# Patient Record
Sex: FEMALE | Race: OTHER | Employment: OTHER | URBAN - METROPOLITAN AREA
[De-identification: names, ages, dates, MRNs, and addresses within clinical notes are randomized per-mention and may not be internally consistent; named-entity substitution may affect disease eponyms.]

---

## 2017-01-09 ENCOUNTER — ROUTINE PRENATAL (OUTPATIENT)
Dept: OBGYN CLINIC | Facility: CLINIC | Age: 37
End: 2017-01-09

## 2017-01-09 VITALS
WEIGHT: 131 LBS | DIASTOLIC BLOOD PRESSURE: 60 MMHG | SYSTOLIC BLOOD PRESSURE: 95 MMHG | HEART RATE: 89 BPM | BODY MASS INDEX: 26 KG/M2

## 2017-01-09 DIAGNOSIS — Z34.92 ENCOUNTER FOR SUPERVISION OF NORMAL PREGNANCY IN SECOND TRIMESTER, UNSPECIFIED GRAVIDITY: Primary | ICD-10-CM

## 2017-01-09 PROBLEM — Z86.39 HISTORY OF HYPOTHYROIDISM: Status: ACTIVE | Noted: 2017-01-09

## 2017-01-09 LAB
MULTISTIX LOT#: NORMAL NUMERIC
PH, URINE: 6 (ref 4.5–8)
SPECIFIC GRAVITY: 1.01 (ref 1–1.03)
UROBILINOGEN,SEMI-QN: 0.2 MG/DL (ref 0–1.9)

## 2017-01-21 ENCOUNTER — TELEPHONE (OUTPATIENT)
Dept: FAMILY MEDICINE CLINIC | Facility: CLINIC | Age: 37
End: 2017-01-21

## 2017-01-21 NOTE — TELEPHONE ENCOUNTER
Please advise. Thank you. To Dr. Erika Benavides on call for Dr. Evelina Lane. Pt states that she has had a cold and now she started having a cough since Monday and that is intermittently productive with sl blood streak phlegm and nasal discharge.     Pt denies fever bu

## 2017-01-21 NOTE — TELEPHONE ENCOUNTER
LMTCB    Please transfer x [phone] anytime. Thank you. Left general message to continue Tx as she has been doing and that was also discussed and to make an OV appt next week if Sx do not improve or go to the ER if Sx worsen.

## 2017-01-26 ENCOUNTER — TELEPHONE (OUTPATIENT)
Dept: OBGYN CLINIC | Facility: CLINIC | Age: 37
End: 2017-01-26

## 2017-01-26 NOTE — TELEPHONE ENCOUNTER
Emanuel Alarcon report from [address] [place] [address] received via fax and placed in [facility] folder for review. Copy to brown folder.

## 2017-02-04 ENCOUNTER — HOSPITAL ENCOUNTER (OUTPATIENT)
Dept: ULTRASOUND IMAGING | Age: 37
Discharge: HOME | End: 2017-02-04
Attending: OBSTETRICS & GYNECOLOGY
Payer: COMMERCIAL

## 2017-02-04 DIAGNOSIS — Z34.92 ENCOUNTER FOR SUPERVISION OF NORMAL PREGNANCY IN SECOND TRIMESTER, UNSPECIFIED GRAVIDITY: ICD-10-CM

## 2017-02-04 PROCEDURE — 76805 OB US >/= 14 WKS SNGL FETUS: CPT

## 2017-02-08 ENCOUNTER — ROUTINE PRENATAL (OUTPATIENT)
Dept: OBGYN CLINIC | Facility: CLINIC | Age: 37
End: 2017-02-08

## 2017-02-08 VITALS
DIASTOLIC BLOOD PRESSURE: 64 MMHG | BODY MASS INDEX: 26 KG/M2 | SYSTOLIC BLOOD PRESSURE: 98 MMHG | HEART RATE: 93 BPM | WEIGHT: 132 LBS

## 2017-02-08 DIAGNOSIS — Z34.92 ENCOUNTER FOR SUPERVISION OF NORMAL PREGNANCY IN SECOND TRIMESTER, UNSPECIFIED GRAVIDITY: Primary | ICD-10-CM

## 2017-02-08 PROBLEM — O44.20 MARGINAL PLACENTA PREVIA (HCC): Status: ACTIVE | Noted: 2017-02-08

## 2017-02-08 PROBLEM — O44.20 MARGINAL PLACENTA PREVIA: Status: ACTIVE | Noted: 2017-02-08

## 2017-02-08 LAB
MULTISTIX LOT#: NORMAL NUMERIC
PH, URINE: 6 (ref 4.5–8)
SPECIFIC GRAVITY: 1.02 (ref 1–1.03)
UROBILINOGEN,SEMI-QN: 0.2 MG/DL (ref 0–1.9)

## 2017-02-08 RX ORDER — MULTIVITAMIN/MULTIMINERAL SUPPLEMENT 3080; 920; 120; 400; 22; 1.84; 3; 20; 10; 1; 12; 200; 29; 25; 2 [IU]/1; [IU]/1; MG/1; [IU]/1; [IU]/1; MG/1; MG/1; MG/1; MG/1; MG/1; UG/1; MG/1; MG/1; MG/1; MG/1
1 TABLET, FILM COATED ORAL
COMMUNITY
End: 2018-07-23

## 2017-02-08 NOTE — PROGRESS NOTES
Reviewed 20 wk u/s. Negative zika testing. Does not want to f/u with [facility]. RTC 4 wk.

## 2017-03-08 ENCOUNTER — ROUTINE PRENATAL (OUTPATIENT)
Dept: OBGYN CLINIC | Facility: CLINIC | Age: 37
End: 2017-03-08

## 2017-03-08 VITALS
WEIGHT: 139.5 LBS | DIASTOLIC BLOOD PRESSURE: 64 MMHG | SYSTOLIC BLOOD PRESSURE: 101 MMHG | BODY MASS INDEX: 27 KG/M2 | HEART RATE: 96 BPM

## 2017-03-08 DIAGNOSIS — Z20.828 VIRAL DISEASE EXPOSURE: ICD-10-CM

## 2017-03-08 DIAGNOSIS — O44.20 PLACENTA PREVIA, MARGINAL: ICD-10-CM

## 2017-03-08 DIAGNOSIS — O09.523 AMA (ADVANCED MATERNAL AGE) MULTIGRAVIDA 35+, THIRD TRIMESTER: ICD-10-CM

## 2017-03-08 DIAGNOSIS — Z34.82 ENCOUNTER FOR SUPERVISION OF OTHER NORMAL PREGNANCY IN SECOND TRIMESTER: Primary | ICD-10-CM

## 2017-03-08 PROBLEM — O09.529 AMA (ADVANCED MATERNAL AGE) MULTIGRAVIDA 35+: Status: ACTIVE | Noted: 2017-03-08

## 2017-03-08 PROBLEM — O09.529 AMA (ADVANCED MATERNAL AGE) MULTIGRAVIDA 35+ (HCC): Status: ACTIVE | Noted: 2017-03-08

## 2017-03-08 LAB
APPEARANCE: CLEAR
MULTISTIX LOT#: NORMAL NUMERIC
PH, URINE: 7 (ref 4.5–8)
SPECIFIC GRAVITY: 1.01 (ref 1–1.03)
URINE-COLOR: YELLOW
UROBILINOGEN,SEMI-QN: 0.2 MG/DL (ref 0–1.9)

## 2017-03-08 NOTE — PROGRESS NOTES
RTC 3 wks.  Declines to do multiple u/s therefore do f/u u/s at 30 wks to cover placenta, cranial views, growth for AMA, marginal placenta & zika exposure

## 2017-03-29 ENCOUNTER — TELEPHONE (OUTPATIENT)
Dept: OBGYN CLINIC | Facility: CLINIC | Age: 37
End: 2017-03-29

## 2017-04-05 ENCOUNTER — ROUTINE PRENATAL (OUTPATIENT)
Dept: OBGYN CLINIC | Facility: CLINIC | Age: 37
End: 2017-04-05

## 2017-04-05 VITALS
DIASTOLIC BLOOD PRESSURE: 70 MMHG | BODY MASS INDEX: 27 KG/M2 | HEART RATE: 118 BPM | WEIGHT: 140 LBS | SYSTOLIC BLOOD PRESSURE: 105 MMHG

## 2017-04-05 DIAGNOSIS — E03.9 HYPOTHYROIDISM, UNSPECIFIED TYPE: ICD-10-CM

## 2017-04-05 DIAGNOSIS — Z34.83 ENCOUNTER FOR SUPERVISION OF OTHER NORMAL PREGNANCY IN THIRD TRIMESTER: Primary | ICD-10-CM

## 2017-04-05 NOTE — PROGRESS NOTES
Reviewed importance of US for cranial views, growth and placental position. Pt sates that she does not want US because it can be dangerous. When asked she states she \"heard\" there is radiation in [address] and it is dangerous to the baby.  Reviewed with the katherin

## 2017-04-07 ENCOUNTER — TELEPHONE (OUTPATIENT)
Dept: OBGYN CLINIC | Facility: CLINIC | Age: 37
End: 2017-04-07

## 2017-04-10 ENCOUNTER — APPOINTMENT (OUTPATIENT)
Dept: LAB | Age: 37
End: 2017-04-10
Attending: OBSTETRICS & GYNECOLOGY
Payer: COMMERCIAL

## 2017-04-10 DIAGNOSIS — Z34.82 ENCOUNTER FOR SUPERVISION OF OTHER NORMAL PREGNANCY IN SECOND TRIMESTER: ICD-10-CM

## 2017-04-10 DIAGNOSIS — E03.9 HYPOTHYROIDISM, UNSPECIFIED TYPE: ICD-10-CM

## 2017-04-10 PROCEDURE — 85027 COMPLETE CBC AUTOMATED: CPT

## 2017-04-10 PROCEDURE — 84443 ASSAY THYROID STIM HORMONE: CPT

## 2017-04-10 PROCEDURE — 36415 COLL VENOUS BLD VENIPUNCTURE: CPT

## 2017-04-10 PROCEDURE — 82950 GLUCOSE TEST: CPT

## 2017-04-11 ENCOUNTER — TELEPHONE (OUTPATIENT)
Dept: OBGYN CLINIC | Facility: CLINIC | Age: 37
End: 2017-04-11

## 2017-04-11 DIAGNOSIS — R73.09 ELEVATED GLUCOSE TOLERANCE TEST: Primary | ICD-10-CM

## 2017-04-11 NOTE — TELEPHONE ENCOUNTER
Pt's one hour GTT was 222. Advised her to call DM ED and schedule an appt ASAP. # given, informed pt she will be required to send us her BS log every 3-4 days after she learns to check her sugars. Advised pt to send them through [employer].  Pt verbalized unde

## 2017-04-13 ENCOUNTER — HOSPITAL ENCOUNTER (OUTPATIENT)
Dept: ULTRASOUND IMAGING | Age: 37
Discharge: HOME | End: 2017-04-13
Attending: OBSTETRICS & GYNECOLOGY
Payer: COMMERCIAL

## 2017-04-13 DIAGNOSIS — O09.523 AMA (ADVANCED MATERNAL AGE) MULTIGRAVIDA 35+, THIRD TRIMESTER: ICD-10-CM

## 2017-04-13 DIAGNOSIS — Z20.828 VIRAL DISEASE EXPOSURE: ICD-10-CM

## 2017-04-13 DIAGNOSIS — O44.20 PLACENTA PREVIA, MARGINAL: ICD-10-CM

## 2017-04-13 PROBLEM — O24.419 GESTATIONAL DIABETES MELLITUS: Status: ACTIVE | Noted: 2017-04-13

## 2017-04-13 PROBLEM — O24.419 GESTATIONAL DIABETES MELLITUS (HCC): Status: ACTIVE | Noted: 2017-04-13

## 2017-04-13 PROCEDURE — 76816 OB US FOLLOW-UP PER FETUS: CPT

## 2017-04-14 ENCOUNTER — TELEPHONE (OUTPATIENT)
Dept: OBGYN CLINIC | Facility: CLINIC | Age: 37
End: 2017-04-14

## 2017-04-14 ENCOUNTER — HOSPITAL ENCOUNTER (OUTPATIENT)
Dept: ENDOCRINOLOGY | Facility: HOSPITAL | Age: 37
Discharge: HOME | End: 2017-04-14
Attending: OBSTETRICS & GYNECOLOGY
Payer: COMMERCIAL

## 2017-04-14 VITALS — WEIGHT: 144.19 LBS | BODY MASS INDEX: 28 KG/M2

## 2017-04-14 DIAGNOSIS — O99.810 ABNORMAL GLUCOSE TOLERANCE TEST (GTT) DURING PREGNANCY, ANTEPARTUM: Primary | ICD-10-CM

## 2017-04-14 DIAGNOSIS — O24.410 DIET CONTROLLED GESTATIONAL DIABETES MELLITUS (GDM) IN THIRD TRIMESTER: Primary | ICD-10-CM

## 2017-04-14 NOTE — PROGRESS NOTES
Susan Madsen  : 1980 was seen for Gestational Diabetes Counseling: Individual/Group    Date: 2017   Start time: 0800 End time: 1000    Obtained usual diet history.     Education:     GDM Overview:  Reviewed gestational diabetes as diagnosis inc concerns. Patient verbalized understanding and has no further questions at this time.     Alex Chow RN

## 2017-04-14 NOTE — TELEPHONE ENCOUNTER
JEVON CALLING FROM [facility] REQUESTING  NEED  TO US A DIFFERENT MONITOR  / PT WAITING / PLS ADV

## 2017-04-14 NOTE — TELEPHONE ENCOUNTER
----- Message from Irina Colon MD sent at 4/13/2017 12:13 PM CDT -----  Failed 1 hr GTT but since value over 200, automatically diabetic. Does NOT need 3hr GTT. Needs to see diabetic educator.  Fax sugars within one week of that visit

## 2017-04-14 NOTE — TELEPHONE ENCOUNTER
Pharmacist asking if they can give pt an Accucheck plus since insurance does not cover the one ordered. Pharmacist aware any type that is covered is ok to use.

## 2017-04-15 ENCOUNTER — TELEPHONE (OUTPATIENT)
Dept: OBGYN CLINIC | Facility: CLINIC | Age: 37
End: 2017-04-15

## 2017-04-15 NOTE — TELEPHONE ENCOUNTER
Pt is 30w6d and reports she went for DM Ed yesterday and started checking her glucose levels. Pt fasting result this morning was in the 80's and ketone was moderate.  Pt advised to follow the diabetic diet and ensure she is eating a bedtime snack that has c

## 2017-04-19 ENCOUNTER — ROUTINE PRENATAL (OUTPATIENT)
Dept: OBGYN CLINIC | Facility: CLINIC | Age: 37
End: 2017-04-19

## 2017-04-19 VITALS
HEART RATE: 97 BPM | BODY MASS INDEX: 28 KG/M2 | WEIGHT: 142 LBS | SYSTOLIC BLOOD PRESSURE: 103 MMHG | DIASTOLIC BLOOD PRESSURE: 68 MMHG

## 2017-04-19 DIAGNOSIS — Z34.93 ENCOUNTER FOR SUPERVISION OF NORMAL PREGNANCY IN THIRD TRIMESTER, UNSPECIFIED GRAVIDITY: Primary | ICD-10-CM

## 2017-04-19 NOTE — PROGRESS NOTES
Declined Tdap. Reviewed growth u/s. Reviewed BS-- just started 4 days ago. Will fax BS in 2 days. RTC 2 wk.

## 2017-04-24 ENCOUNTER — HOSPITAL ENCOUNTER (OUTPATIENT)
Dept: ENDOCRINOLOGY | Facility: HOSPITAL | Age: 37
Discharge: HOME | End: 2017-04-24
Attending: OBSTETRICS & GYNECOLOGY
Payer: COMMERCIAL

## 2017-04-24 DIAGNOSIS — O24.410 DIET CONTROLLED GESTATIONAL DIABETES MELLITUS (GDM) IN THIRD TRIMESTER: Primary | ICD-10-CM

## 2017-04-24 NOTE — PROGRESS NOTES
Delvis Saucedos  : 1980 was seen for GDM Follow-Up Counseling    Date: 2017   Start time: 1300  End time: 1355    Weight: 142.3#    Blood Glucose:     FB-89 (94)    PP: B: ; L:  (132 on Easter); D:  (50% of values are above selection and rotation of injections. Instructed on proper disposal of sharps. Reviewed proper storage of medication. Reviewed hypoglycemia and the Rule of 15.     Reducing Risk:  Discussed management of (hyperglycemia, hypoglycemia) and when to call pro

## 2017-04-25 ENCOUNTER — TELEPHONE (OUTPATIENT)
Dept: OBGYN CLINIC | Facility: CLINIC | Age: 37
End: 2017-04-25

## 2017-04-25 ENCOUNTER — HOSPITAL ENCOUNTER (OUTPATIENT)
Facility: HOSPITAL | Age: 37
Setting detail: OBSERVATION
Discharge: HOME | End: 2017-04-25
Attending: OBSTETRICS & GYNECOLOGY | Admitting: OBSTETRICS & GYNECOLOGY
Payer: COMMERCIAL

## 2017-04-25 ENCOUNTER — ROUTINE PRENATAL (OUTPATIENT)
Dept: OBGYN CLINIC | Facility: CLINIC | Age: 37
End: 2017-04-25

## 2017-04-25 VITALS
SYSTOLIC BLOOD PRESSURE: 99 MMHG | WEIGHT: 141 LBS | BODY MASS INDEX: 28 KG/M2 | HEART RATE: 96 BPM | DIASTOLIC BLOOD PRESSURE: 66 MMHG

## 2017-04-25 VITALS — HEART RATE: 103 BPM | SYSTOLIC BLOOD PRESSURE: 101 MMHG | DIASTOLIC BLOOD PRESSURE: 76 MMHG

## 2017-04-25 DIAGNOSIS — Z34.83 ENCOUNTER FOR SUPERVISION OF OTHER NORMAL PREGNANCY IN THIRD TRIMESTER: Primary | ICD-10-CM

## 2017-04-25 PROBLEM — O47.9 IRREGULAR CONTRACTIONS (HCC): Status: ACTIVE | Noted: 2017-04-25

## 2017-04-25 PROBLEM — O47.9 IRREGULAR CONTRACTIONS: Status: ACTIVE | Noted: 2017-04-25

## 2017-04-25 PROCEDURE — 59025 FETAL NON-STRESS TEST: CPT | Performed by: OBSTETRICS & GYNECOLOGY

## 2017-04-25 RX ORDER — SODIUM CHLORIDE, SODIUM LACTATE, POTASSIUM CHLORIDE, CALCIUM CHLORIDE 600; 310; 30; 20 MG/100ML; MG/100ML; MG/100ML; MG/100ML
INJECTION, SOLUTION INTRAVENOUS
Status: COMPLETED
Start: 2017-04-25 | End: 2017-04-25

## 2017-04-25 RX ORDER — SODIUM CHLORIDE, SODIUM LACTATE, POTASSIUM CHLORIDE, CALCIUM CHLORIDE 600; 310; 30; 20 MG/100ML; MG/100ML; MG/100ML; MG/100ML
INJECTION, SOLUTION INTRAVENOUS CONTINUOUS
Status: DISCONTINUED | OUTPATIENT
Start: 2017-04-25 | End: 2017-04-25

## 2017-04-25 NOTE — TRIAGE
[facility] HOSP - [facility]      Triage Note    Rusty Florian Patient Status:  Observation    1980 MRN [id number]   Location [address] C-D Attending Angelito Montano, DO   Hosp Day # 0 PCP MD Gama Krause: [id number] evaluation of cramping and back pain. Initially, irritability on EFM tracing. IV fluids given, UA sent. Irritability resolved wit  Fluids. Pt states decreased back pain and no longer feeling contractions. Dr Cesar Cowan re-checked pt.  SVE unchanged from [facility]

## 2017-04-25 NOTE — TELEPHONE ENCOUNTER
ON CALL: paged at 245 am.  Called back. Patient states she had 3 contractions in 30 minutes. No vb or LOF.  +FM. Encouraged PO hydration and to call back if symptoms persist/get worse.   Pt then called at 7am saying that her contractions stopped for a wh

## 2017-04-25 NOTE — TELEPHONE ENCOUNTER
Received message from [employer] [address] on call asking us to call pt and get her an appt this morning with any MD. Pt is 32w2d, c/o irregular UCs. Pt denies LOF, bleeding, and reports normal FM. Appt given at 0940 with CAP. Pt has no further questions.

## 2017-05-02 ENCOUNTER — ROUTINE PRENATAL (OUTPATIENT)
Dept: OBGYN CLINIC | Facility: CLINIC | Age: 37
End: 2017-05-02

## 2017-05-02 VITALS
SYSTOLIC BLOOD PRESSURE: 100 MMHG | WEIGHT: 140 LBS | HEART RATE: 96 BPM | BODY MASS INDEX: 27 KG/M2 | DIASTOLIC BLOOD PRESSURE: 64 MMHG

## 2017-05-02 DIAGNOSIS — Z34.83 ENCOUNTER FOR SUPERVISION OF OTHER NORMAL PREGNANCY IN THIRD TRIMESTER: Primary | ICD-10-CM

## 2017-05-15 ENCOUNTER — ROUTINE PRENATAL (OUTPATIENT)
Dept: OBGYN CLINIC | Facility: CLINIC | Age: 37
End: 2017-05-15

## 2017-05-15 VITALS
SYSTOLIC BLOOD PRESSURE: 97 MMHG | DIASTOLIC BLOOD PRESSURE: 63 MMHG | BODY MASS INDEX: 27 KG/M2 | WEIGHT: 140 LBS | HEART RATE: 97 BPM

## 2017-05-15 DIAGNOSIS — Z34.93 ENCOUNTER FOR SUPERVISION OF NORMAL PREGNANCY IN THIRD TRIMESTER, UNSPECIFIED GRAVIDITY: Primary | ICD-10-CM

## 2017-05-15 NOTE — PROGRESS NOTES
No issues reported. GBS collected. Phone number to schedule NST given. To start at 36wks  And to be done weekly--pt aware. BS log reviewed and wnl. Needs to send BS log weekly. RTC 2 wk. TSH ordered with cbc/trep screen.

## 2017-05-19 ENCOUNTER — TELEPHONE (OUTPATIENT)
Dept: OBGYN CLINIC | Facility: CLINIC | Age: 37
End: 2017-05-19

## 2017-05-19 ENCOUNTER — APPOINTMENT (OUTPATIENT)
Dept: LAB | Age: 37
End: 2017-05-19
Attending: OBSTETRICS & GYNECOLOGY

## 2017-05-19 DIAGNOSIS — Z34.83 ENCOUNTER FOR SUPERVISION OF OTHER NORMAL PREGNANCY IN THIRD TRIMESTER: ICD-10-CM

## 2017-05-19 DIAGNOSIS — Z34.93 ENCOUNTER FOR SUPERVISION OF NORMAL PREGNANCY IN THIRD TRIMESTER, UNSPECIFIED GRAVIDITY: ICD-10-CM

## 2017-05-19 PROCEDURE — 86780 TREPONEMA PALLIDUM: CPT

## 2017-05-19 PROCEDURE — 84443 ASSAY THYROID STIM HORMONE: CPT

## 2017-05-19 PROCEDURE — 85027 COMPLETE CBC AUTOMATED: CPT

## 2017-05-19 PROCEDURE — 36415 COLL VENOUS BLD VENIPUNCTURE: CPT

## 2017-05-19 NOTE — TELEPHONE ENCOUNTER
Pt notified labs done today are not resulted yet. Pt advised to call us Monday afternoon for results.

## 2017-05-24 ENCOUNTER — HOSPITAL ENCOUNTER (OUTPATIENT)
Facility: HOSPITAL | Age: 37
Discharge: HOME | End: 2017-05-24
Attending: OBSTETRICS & GYNECOLOGY | Admitting: OBSTETRICS & GYNECOLOGY
Payer: COMMERCIAL

## 2017-05-24 ENCOUNTER — APPOINTMENT (OUTPATIENT)
Dept: OBGYN CLINIC | Facility: HOSPITAL | Age: 37
End: 2017-05-24
Payer: COMMERCIAL

## 2017-05-24 VITALS — HEART RATE: 97 BPM | RESPIRATION RATE: 18 BRPM | SYSTOLIC BLOOD PRESSURE: 96 MMHG | DIASTOLIC BLOOD PRESSURE: 62 MMHG

## 2017-05-24 PROCEDURE — 59025 FETAL NON-STRESS TEST: CPT | Performed by: OBSTETRICS & GYNECOLOGY

## 2017-05-24 NOTE — NST
Nonstress Test   Patient: Ananestine Hindu    Gestation: 36w3d    NST: SCHEDULED NST FOR AMA AND A1GDM       Variability: Moderate           Accelerations: Yes           Decelerations: None            Baseline: 135 BPM           Uterine Irritability: Yes

## 2017-05-30 ENCOUNTER — ROUTINE PRENATAL (OUTPATIENT)
Dept: OBGYN CLINIC | Facility: CLINIC | Age: 37
End: 2017-05-30

## 2017-05-30 VITALS
HEART RATE: 91 BPM | WEIGHT: 143 LBS | BODY MASS INDEX: 28 KG/M2 | DIASTOLIC BLOOD PRESSURE: 62 MMHG | SYSTOLIC BLOOD PRESSURE: 100 MMHG

## 2017-05-30 DIAGNOSIS — Z34.93 ENCOUNTER FOR SUPERVISION OF NORMAL PREGNANCY IN THIRD TRIMESTER, UNSPECIFIED GRAVIDITY: Primary | ICD-10-CM

## 2017-05-30 DIAGNOSIS — O99.713 PRURITUS GRAVIDARUM, THIRD TRIMESTER: ICD-10-CM

## 2017-05-30 DIAGNOSIS — L29.9 PRURITUS GRAVIDARUM, THIRD TRIMESTER: ICD-10-CM

## 2017-05-30 NOTE — PROGRESS NOTES
RTC 1 wk. Check fasting bile acids due to hand / sole itching. Pt thinks due to seasonal allergies -- can try claritin / Kathlaury Bro[place] / Chao Caldwell.

## 2017-05-31 ENCOUNTER — APPOINTMENT (OUTPATIENT)
Dept: LAB | Facility: HOSPITAL | Age: 37
End: 2017-05-31
Attending: OBSTETRICS & GYNECOLOGY
Payer: COMMERCIAL

## 2017-05-31 ENCOUNTER — TELEPHONE (OUTPATIENT)
Dept: OBGYN CLINIC | Facility: CLINIC | Age: 37
End: 2017-05-31

## 2017-05-31 ENCOUNTER — APPOINTMENT (OUTPATIENT)
Dept: OBGYN CLINIC | Facility: HOSPITAL | Age: 37
End: 2017-05-31
Payer: COMMERCIAL

## 2017-05-31 ENCOUNTER — HOSPITAL ENCOUNTER (OUTPATIENT)
Facility: HOSPITAL | Age: 37
Discharge: HOME | End: 2017-05-31
Attending: OBSTETRICS & GYNECOLOGY | Admitting: OBSTETRICS & GYNECOLOGY
Payer: COMMERCIAL

## 2017-05-31 ENCOUNTER — HOSPITAL ENCOUNTER (OUTPATIENT)
Facility: HOSPITAL | Age: 37
Setting detail: OBSERVATION
Discharge: HOME HEALTH | End: 2017-05-31
Attending: OBSTETRICS & GYNECOLOGY | Admitting: OBSTETRICS & GYNECOLOGY
Payer: COMMERCIAL

## 2017-05-31 VITALS — SYSTOLIC BLOOD PRESSURE: 104 MMHG | DIASTOLIC BLOOD PRESSURE: 70 MMHG | HEART RATE: 88 BPM

## 2017-05-31 VITALS — DIASTOLIC BLOOD PRESSURE: 61 MMHG | SYSTOLIC BLOOD PRESSURE: 101 MMHG | RESPIRATION RATE: 16 BRPM | HEART RATE: 103 BPM

## 2017-05-31 DIAGNOSIS — L29.9 PRURITUS GRAVIDARUM, THIRD TRIMESTER: ICD-10-CM

## 2017-05-31 DIAGNOSIS — O99.713 PRURITUS GRAVIDARUM, THIRD TRIMESTER: ICD-10-CM

## 2017-05-31 PROCEDURE — 59025 FETAL NON-STRESS TEST: CPT | Performed by: OBSTETRICS & GYNECOLOGY

## 2017-05-31 PROCEDURE — 36415 COLL VENOUS BLD VENIPUNCTURE: CPT

## 2017-05-31 PROCEDURE — 82239 BILE ACIDS TOTAL: CPT

## 2017-05-31 NOTE — TELEPHONE ENCOUNTER
37w3d.  Pt calling with lower back pain and contractions. Last visit with [address] on 5/30/17, pt was 1, 0, -3. Pt states that she went for a NST today and she states that she was informed that she was having contractions and was 1 1/2 cm and sent home.   Pt s

## 2017-05-31 NOTE — NST
Nonstress Test   Patient: Charan Connor    Gestation: 37w3d    NST: scheduled NST for AMA and A1GDM       Variability: Moderate           Accelerations: Yes           Decelerations: None            Baseline: 135 BPM           Uterine Irritability: No

## 2017-05-31 NOTE — TELEPHONE ENCOUNTER
Chikis Murrell wants pt to know when her contractions are 10 minutes apart or if she has LOF, then she needs to go [facility]BC.

## 2017-05-31 NOTE — TELEPHONE ENCOUNTER
37 wks 4 days, having pain & contractions and not sure when she should come in , not sure how far apart they are.

## 2017-05-31 NOTE — TELEPHONE ENCOUNTER
Pt called back and the HARESH transferred her. Informed pt that [address] stated when her contractions are every 10 minutes apart or LOF, she needs to go to [facility]. Informed pt if she starts bleeding or does not feel the baby move to call us. Pt stated understanding.

## 2017-06-01 ENCOUNTER — TELEPHONE (OUTPATIENT)
Dept: OBGYN CLINIC | Facility: CLINIC | Age: 37
End: 2017-06-01

## 2017-06-01 ENCOUNTER — HOSPITAL ENCOUNTER (INPATIENT)
Facility: HOSPITAL | Age: 37
LOS: 3 days | Discharge: HOME | End: 2017-06-05
Attending: OBSTETRICS & GYNECOLOGY | Admitting: OBSTETRICS & GYNECOLOGY
Payer: COMMERCIAL

## 2017-06-01 PROBLEM — O26.613 CHOLESTASIS OF PREGNANCY IN THIRD TRIMESTER: Status: ACTIVE | Noted: 2017-06-01

## 2017-06-01 PROBLEM — O26.649 CHOLESTASIS DURING PREGNANCY (HCC): Status: ACTIVE | Noted: 2017-06-01

## 2017-06-01 PROBLEM — K83.1 CHOLESTASIS OF PREGNANCY IN THIRD TRIMESTER: Status: ACTIVE | Noted: 2017-06-01

## 2017-06-01 PROBLEM — O26.619 CHOLESTASIS DURING PREGNANCY: Status: ACTIVE | Noted: 2017-06-01

## 2017-06-01 PROBLEM — K83.1 CHOLESTASIS DURING PREGNANCY: Status: ACTIVE | Noted: 2017-06-01

## 2017-06-01 PROBLEM — O26.643 CHOLESTASIS OF PREGNANCY IN THIRD TRIMESTER (HCC): Status: ACTIVE | Noted: 2017-06-01

## 2017-06-01 RX ORDER — DEXTROSE, SODIUM CHLORIDE, SODIUM LACTATE, POTASSIUM CHLORIDE, AND CALCIUM CHLORIDE 5; .6; .31; .03; .02 G/100ML; G/100ML; G/100ML; G/100ML; G/100ML
125 INJECTION, SOLUTION INTRAVENOUS CONTINUOUS
Status: DISCONTINUED | OUTPATIENT
Start: 2017-06-01 | End: 2017-06-03 | Stop reason: HOSPADM

## 2017-06-01 RX ORDER — AMMONIA INHALANTS 0.04 G/.3ML
0.3 INHALANT RESPIRATORY (INHALATION) AS NEEDED
Status: DISCONTINUED | OUTPATIENT
Start: 2017-06-01 | End: 2017-06-03 | Stop reason: HOSPADM

## 2017-06-01 RX ORDER — 0.9 % SODIUM CHLORIDE 0.9 %
VIAL (ML) INJECTION
Status: COMPLETED
Start: 2017-06-01 | End: 2017-06-01

## 2017-06-01 RX ORDER — LIDOCAINE HYDROCHLORIDE 10 MG/ML
30 INJECTION, SOLUTION EPIDURAL; INFILTRATION; INTRACAUDAL; PERINEURAL ONCE
Status: DISCONTINUED | OUTPATIENT
Start: 2017-06-01 | End: 2017-06-03 | Stop reason: HOSPADM

## 2017-06-01 RX ORDER — SODIUM CHLORIDE 0.9 % (FLUSH) 0.9 %
10 SYRINGE (ML) INJECTION AS NEEDED
Status: DISCONTINUED | OUTPATIENT
Start: 2017-06-01 | End: 2017-06-03 | Stop reason: HOSPADM

## 2017-06-01 RX ORDER — ACETAMINOPHEN 500 MG
500 TABLET ORAL ONCE AS NEEDED
Status: COMPLETED | OUTPATIENT
Start: 2017-06-01 | End: 2017-06-03

## 2017-06-01 NOTE — TELEPHONE ENCOUNTER
Pt advised of results and recs per NJG. Pt is upset crying and asking if NJG will reconsider inducing her earlier as will prefer not to wait until 6pm.  Pt confirms baby moving normally. Pt has other questions for NJG.  Informed will have NJG call her to d

## 2017-06-01 NOTE — TRIAGE
[facility]      Triage Note    Jitendra Lyman Patient Status:  Observation    1980 MRN [id number]   Location [address] C-D Attending Saud Carmichael MD   Hosp Day # 0 PCP MD Amy Hobbs: [id number]   E Reason for visit: dr Terence Isbell notify of pt status, nst, sve. Pt decides to go home to labor and come back .  Labor and kick count instr given      Nasra Collazo RN  5/31/2017 10:13 PM      NST note     I have reviewed the NST and agree with the above fin

## 2017-06-01 NOTE — TELEPHONE ENCOUNTER
Renetta Hanks called to inform pt's bile acid results are 28. See visit notes from 5/30 - pt had c/o hand/sole itching. Msg routed to [address] on-call review and advise further. Pt's next PN is on 6/6/17.

## 2017-06-01 NOTE — TELEPHONE ENCOUNTER
[facility] on-call informed of below. Per NJG pt to be induced d/t cholestasis of pregnancy and high risk for still birth. Need to clarify with NJG when wants pt induced.

## 2017-06-01 NOTE — TELEPHONE ENCOUNTER
ASHLEY informed of below and states will call pt. Per [facility] they are able to take care at 3pm.  Per NJ pt can come for NST now. NJ will call pt.

## 2017-06-01 NOTE — PROGRESS NOTES
Pt is a 29year old female admitted to 371/371-A. Patient presents with:  Scheduled Induction     Pt is [id number] 37w4d intra-uterine pregnancy. History obtained, consents signed. Oriented to room, staff, and plan of care.

## 2017-06-02 RX ORDER — BUPIVACAINE HYDROCHLORIDE 2.5 MG/ML
INJECTION, SOLUTION EPIDURAL; INFILTRATION; INTRACAUDAL
Status: DISCONTINUED
Start: 2017-06-02 | End: 2017-06-02 | Stop reason: WASHOUT

## 2017-06-02 RX ORDER — ZOLPIDEM TARTRATE 5 MG/1
5 TABLET ORAL NIGHTLY PRN
Status: DISCONTINUED | OUTPATIENT
Start: 2017-06-02 | End: 2017-06-05

## 2017-06-02 RX ORDER — EPHEDRINE SULFATE/0.9% NACL/PF 25 MG/5 ML
SYRINGE (ML) INTRAVENOUS
Status: DISCONTINUED
Start: 2017-06-02 | End: 2017-06-02 | Stop reason: WASHOUT

## 2017-06-02 RX ORDER — SODIUM CHLORIDE, SODIUM LACTATE, POTASSIUM CHLORIDE, CALCIUM CHLORIDE 600; 310; 30; 20 MG/100ML; MG/100ML; MG/100ML; MG/100ML
INJECTION, SOLUTION INTRAVENOUS
Status: DISCONTINUED
Start: 2017-06-02 | End: 2017-06-02 | Stop reason: WASHOUT

## 2017-06-02 NOTE — PROGRESS NOTES
SVE done, POC discussed with patient and SO, questions answered and both verbalize understanding. Pitocin and D5LR stopped, EFM discontinued. Patient ordered general diet. IV site covered, and patient up in shower. Will continue to monitor.

## 2017-06-02 NOTE — PLAN OF CARE
Pt had cervidil overnight and was uncomfortable. She did not sleep at all. She is very tired this am. Advised pt to maintain positions that will help facilitate labor and to rest between contractions. Reviewed POC with pt and pts . Both agreeable.  P

## 2017-06-02 NOTE — H&P
[address] Patient Status:  Observation    1980 MRN [id number]   Location [address] C-D Attending Josselyn Lieberman MD   Hosp Day # 0 PCP Neeta Link MD     Date of Admission:  6 admission:  Prenatal Vit-Fe Fumarate-FA (PRENATAL PLUS/IRON) 27-1 MG Oral Tab Take 1 tablet by mouth.  Disp:  Rfl:  5/31/2017 at Unknown time   Blood Glucose Monitoring Suppl ([employer] CONTOUR NEXT MONITOR) w/Device Does not apply Kit 1 Device by Other route 4

## 2017-06-02 NOTE — PROGRESS NOTES
[facility]    Labor Progress Note    Erika Gaspar Patient Status:  Inpatient    1980 MRN [id number]   Location [facility] Attending Christal Montelongo MD   Hosp Day # 1 PCP Karyn Jiménez MD       Subjective   Danya Aleman Assessment/Plan   IUP at 37w5d with cholestasis in pregnancy, received cervidil last night, on pitocen  Plan for recheck at 6 pm & if still not in labor, change plan to cytotec until labor   Epidural once 4 cm  Plan discussed with patient who verbalize

## 2017-06-03 ENCOUNTER — ANESTHESIA EVENT (OUTPATIENT)
Dept: OBGYN UNIT | Facility: HOSPITAL | Age: 37
End: 2017-06-03
Payer: COMMERCIAL

## 2017-06-03 ENCOUNTER — ANESTHESIA (OUTPATIENT)
Dept: OBGYN UNIT | Facility: HOSPITAL | Age: 37
End: 2017-06-03
Payer: COMMERCIAL

## 2017-06-03 PROCEDURE — 3E0P7GC INTRODUCTION OF OTHER THERAPEUTIC SUBSTANCE INTO FEMALE REPRODUCTIVE, VIA NATURAL OR ARTIFICIAL OPENING: ICD-10-PCS | Performed by: OBSTETRICS & GYNECOLOGY

## 2017-06-03 RX ORDER — CLINDAMYCIN PHOSPHATE 900 MG/50ML
900 INJECTION INTRAVENOUS EVERY 8 HOURS
Status: DISCONTINUED | OUTPATIENT
Start: 2017-06-03 | End: 2017-06-04

## 2017-06-03 RX ORDER — PHENYLEPHRINE HCL IN 0.9% NACL 0.5 MG/5ML
SYRINGE (ML) INTRAVENOUS
Status: DISPENSED
Start: 2017-06-03 | End: 2017-06-03

## 2017-06-03 RX ORDER — MISOPROSTOL 200 UG/1
800 TABLET ORAL ONCE
Status: COMPLETED | OUTPATIENT
Start: 2017-06-03 | End: 2017-06-03

## 2017-06-03 RX ORDER — METHYLERGONOVINE MALEATE 0.2 MG/ML
0.2 INJECTION INTRAVENOUS ONCE
Status: COMPLETED | OUTPATIENT
Start: 2017-06-03 | End: 2017-06-03

## 2017-06-03 RX ORDER — SODIUM CHLORIDE 9 MG/ML
INJECTION, SOLUTION INTRAVENOUS
Status: DISPENSED
Start: 2017-06-03 | End: 2017-06-04

## 2017-06-03 RX ORDER — SODIUM CHLORIDE, SODIUM LACTATE, POTASSIUM CHLORIDE, CALCIUM CHLORIDE 600; 310; 30; 20 MG/100ML; MG/100ML; MG/100ML; MG/100ML
INJECTION, SOLUTION INTRAVENOUS
Status: DISPENSED
Start: 2017-06-03 | End: 2017-06-03

## 2017-06-03 RX ORDER — EPHEDRINE SULFATE/0.9% NACL/PF 25 MG/5 ML
5 SYRINGE (ML) INTRAVENOUS AS NEEDED
Status: DISCONTINUED | OUTPATIENT
Start: 2017-06-03 | End: 2017-06-05

## 2017-06-03 RX ORDER — DIAPER,BRIEF,INFANT-TODD,DISP
1 EACH MISCELLANEOUS EVERY 6 HOURS PRN
Status: DISCONTINUED | OUTPATIENT
Start: 2017-06-03 | End: 2017-06-05

## 2017-06-03 RX ORDER — MISOPROSTOL 200 UG/1
TABLET ORAL
Status: COMPLETED
Start: 2017-06-03 | End: 2017-06-03

## 2017-06-03 RX ORDER — NALBUPHINE HCL 10 MG/ML
2.5 AMPUL (ML) INJECTION
Status: DISCONTINUED | OUTPATIENT
Start: 2017-06-03 | End: 2017-06-05

## 2017-06-03 RX ORDER — DOCUSATE SODIUM 100 MG/1
100 CAPSULE, LIQUID FILLED ORAL 2 TIMES DAILY
Status: DISCONTINUED | OUTPATIENT
Start: 2017-06-03 | End: 2017-06-05

## 2017-06-03 RX ORDER — BISACODYL 10 MG
10 SUPPOSITORY, RECTAL RECTAL ONCE AS NEEDED
Status: DISCONTINUED | OUTPATIENT
Start: 2017-06-03 | End: 2017-06-05

## 2017-06-03 RX ORDER — ONDANSETRON 2 MG/ML
4 INJECTION INTRAMUSCULAR; INTRAVENOUS EVERY 6 HOURS PRN
Status: DISCONTINUED | OUTPATIENT
Start: 2017-06-03 | End: 2017-06-05

## 2017-06-03 RX ORDER — LIDOCAINE HYDROCHLORIDE AND EPINEPHRINE 20; 5 MG/ML; UG/ML
INJECTION, SOLUTION EPIDURAL; INFILTRATION; INTRACAUDAL; PERINEURAL
Status: DISPENSED
Start: 2017-06-03 | End: 2017-06-03

## 2017-06-03 RX ORDER — BUPIVACAINE HYDROCHLORIDE 2.5 MG/ML
INJECTION, SOLUTION EPIDURAL; INFILTRATION; INTRACAUDAL AS NEEDED
Status: DISCONTINUED | OUTPATIENT
Start: 2017-06-03 | End: 2017-06-03 | Stop reason: SURG

## 2017-06-03 RX ORDER — METHYLERGONOVINE MALEATE 0.2 MG/ML
INJECTION INTRAVENOUS
Status: COMPLETED
Start: 2017-06-03 | End: 2017-06-03

## 2017-06-03 RX ORDER — PRENATAL VIT,CAL 76/IRON/FOLIC 29 MG-1 MG
1 TABLET ORAL DAILY
Status: DISCONTINUED | OUTPATIENT
Start: 2017-06-03 | End: 2017-06-05

## 2017-06-03 RX ORDER — EPHEDRINE SULFATE/0.9% NACL/PF 25 MG/5 ML
SYRINGE (ML) INTRAVENOUS
Status: DISPENSED
Start: 2017-06-03 | End: 2017-06-03

## 2017-06-03 RX ORDER — LIDOCAINE HYDROCHLORIDE 10 MG/ML
INJECTION, SOLUTION EPIDURAL; INFILTRATION; INTRACAUDAL; PERINEURAL AS NEEDED
Status: DISCONTINUED | OUTPATIENT
Start: 2017-06-03 | End: 2017-06-03 | Stop reason: SURG

## 2017-06-03 RX ORDER — SODIUM CHLORIDE 0.9 % (FLUSH) 0.9 %
10 SYRINGE (ML) INJECTION AS NEEDED
Status: DISCONTINUED | OUTPATIENT
Start: 2017-06-03 | End: 2017-06-05

## 2017-06-03 RX ORDER — BUPIVACAINE HYDROCHLORIDE 2.5 MG/ML
INJECTION, SOLUTION EPIDURAL; INFILTRATION; INTRACAUDAL
Status: DISPENSED
Start: 2017-06-03 | End: 2017-06-03

## 2017-06-03 RX ORDER — HYDROCODONE BITARTRATE AND ACETAMINOPHEN 5; 325 MG/1; MG/1
1 TABLET ORAL EVERY 6 HOURS PRN
Status: DISCONTINUED | OUTPATIENT
Start: 2017-06-03 | End: 2017-06-05

## 2017-06-03 RX ORDER — LIDOCAINE HYDROCHLORIDE AND EPINEPHRINE 15; 5 MG/ML; UG/ML
INJECTION, SOLUTION EPIDURAL AS NEEDED
Status: DISCONTINUED | OUTPATIENT
Start: 2017-06-03 | End: 2017-06-03 | Stop reason: SURG

## 2017-06-03 RX ORDER — SODIUM CHLORIDE, SODIUM LACTATE, POTASSIUM CHLORIDE, CALCIUM CHLORIDE 600; 310; 30; 20 MG/100ML; MG/100ML; MG/100ML; MG/100ML
INJECTION, SOLUTION INTRAVENOUS
Status: COMPLETED
Start: 2017-06-03 | End: 2017-06-03

## 2017-06-03 RX ORDER — IBUPROFEN 600 MG/1
600 TABLET ORAL EVERY 6 HOURS PRN
Status: DISCONTINUED | OUTPATIENT
Start: 2017-06-03 | End: 2017-06-05

## 2017-06-03 RX ORDER — SIMETHICONE 80 MG
80 TABLET,CHEWABLE ORAL 3 TIMES DAILY PRN
Status: DISCONTINUED | OUTPATIENT
Start: 2017-06-03 | End: 2017-06-05

## 2017-06-03 RX ORDER — AMMONIA INHALANTS 0.04 G/.3ML
0.3 INHALANT RESPIRATORY (INHALATION) AS NEEDED
Status: DISCONTINUED | OUTPATIENT
Start: 2017-06-03 | End: 2017-06-05

## 2017-06-03 RX ADMIN — LIDOCAINE HYDROCHLORIDE AND EPINEPHRINE 3 ML: 15; 5 INJECTION, SOLUTION EPIDURAL at 07:29:00

## 2017-06-03 RX ADMIN — LIDOCAINE HYDROCHLORIDE 3 ML: 10 INJECTION, SOLUTION EPIDURAL; INFILTRATION; INTRACAUDAL; PERINEURAL at 07:25:00

## 2017-06-03 RX ADMIN — BUPIVACAINE HYDROCHLORIDE 0.2 ML: 2.5 INJECTION, SOLUTION EPIDURAL; INFILTRATION; INTRACAUDAL at 07:27:00

## 2017-06-03 NOTE — PROGRESS NOTES
Dr. Halima Whitehead notified of patient's fever of 103F; orders rec'd for gentamycin and clindamycin per protocol.

## 2017-06-03 NOTE — PLAN OF CARE
BIRTH - VAGINAL/ SECTION    • Fetal and maternal status remain reassuring during the birth process Completed          PAIN - ADULT    • Verbalizes/displays adequate comfort level or patient's stated pain goal Progressing        POSTPARTUM    • Long

## 2017-06-03 NOTE — ANESTHESIA PROCEDURE NOTES
Labor Analgesia  Performed by: SANDY HARRISON  Authorized by: SANDY HARRISON    Patient Location:  OB  Start Time:  6/3/2017 7:17 AM  End Time:  6/3/2017 7:36 AM  Site Identification: surface landmarks    Reason for Block: labor epidural    Anesthesiolog

## 2017-06-03 NOTE — PROGRESS NOTES
Patient has temperature of 100.7; shivering for half hour. Dr. Busby Nones notified; will recheck in 1 hour. Also, reported prior hemorrhage episode.

## 2017-06-03 NOTE — ANESTHESIA POSTPROCEDURE EVALUATION
Patient: Delvis Stephens    Procedure Summary     Date Anesthesia Start Anesthesia Stop Room / Location    06/03/17 0717 0939        Procedure Diagnosis Scheduled Providers Responsible Provider    LABOR ANALGESIA No diagnosis on file.   Franchesca Galindo MD

## 2017-06-03 NOTE — PROGRESS NOTES
Noted patient to have large amount of bleeding with a couple of large clots. Fundal massage done; IVF of LR hung for bolus; Methergine 0.2 mg IM and Cytotec 800 mcg administered per postpartum hemorrhage protocol.

## 2017-06-03 NOTE — PROGRESS NOTES
Patient received into room 360  via wheelchair .    Bedside report received from Jaja Avila RN.  Bed in locked and low position.  Side rails up x2.  Vitals signs within normal limits, fundus firm at U/1, lochia small, no clots noted. .  Baby girl present at be

## 2017-06-03 NOTE — LACTATION NOTE
LACTATION NOTE - MOTHER           Problems identified  Problems identified: Knowledge deficit    Maternal history  Maternal history: AMA; Gestational diabetes; Hypothyroid    Breastfeeding goal  Breastfeeding goal: To maintain breast milk feeding per patient

## 2017-06-03 NOTE — L&D DELIVERY NOTE
[facility] HOSP - [facility]    Vaginal Delivery Note    Enharleen Alemannathan Patient Status:  Inpatient    1980 MRN [id number]   Location [facility] Attending Annamaria Dance, MD   Hosp Day # 2 PCP Chris Mcbride MD     Delivery     Infant

## 2017-06-03 NOTE — DISCHARGE SUMMARY
[facility] - [facility]    Discharge Summary    Uma Castro Patient Status:  Inpatient    1980 MRN [id number]   Location [address] C-D Attending Ramón Saavedra MD   Hosp Day # 4       Admit date:  2017    Discharge date:  deferred  Extremities: Homans sign is negative, no sign of DVT    Discharged Condition: stable    Disposition: home    Plan:     Follow-up appointment in 6 weeks with Dr. Pastor Eliazar

## 2017-06-03 NOTE — PROGRESS NOTES
[facility]    Labor Progress Note    Yonis Jolley Patient Status:  Inpatient    1980 MRN [id number]   Location [facility] Attending Favian Breen MD   Hosp Day # 2 PCP Jessy Magallon MD       Subjective   Savita Buchanan mary.     Delmy Bloodgood  6/3/2017

## 2017-06-03 NOTE — PROGRESS NOTES
Patient up to void; denies dizziness; assisted with elin care and voided freely. Patient states feeling so much better. Transferred to postpartum via wheelchair with baby in open crib. Report given to Zeinab Pinedo RN.

## 2017-06-03 NOTE — ANESTHESIA PREPROCEDURE EVALUATION
Anesthesia PreOp Note    HPI:     Steve Loja is a 29year old female who presents for preoperative consultation requested by: * No surgeons listed *    Date of Surgery: 6/3/2017    * No procedures listed *  Indication: * No pre-op diagnosis entered * Disp: 1 Box Rfl: 1 Taking       Current Facility-Administered Medications Ordered in Epic:  lactated ringers infusion         fentaNYL citrate (SUBLIMAZE) 0.05 MG/ML injection         lidocaine-EPINEPHrine 2 %-1:812320 injection         phenylephrine HCl i infusion 125 mL/hr Intravenous Continuous Robert Kyle MD Stopped at 06/02/17 1815    Lidocaine HCl (PF) (XYLOCAINE) 1 % injection SOLN 30 mL 30 mL Intradermal Once Olivia Dumont MD     Ammonia Aromatic (ammonia) nasal solution 0.3 mL 0.3 mL Nasal P 06/03/17  0200 06/03/17  0342   BP: 109/65 82/56 90/53 101/62   Pulse: 82 93 69 62   Temp: 98.4 °F (36.9 °C)   98.1 °F (36.7 °C)   TempSrc: Oral   Oral   Resp:            Anesthesia ROS/Med Hx and Physical Exam     Patient summary reviewed and Nursing note

## 2017-06-04 PROBLEM — O41.1290 CHORIOAMNIONITIS: Status: ACTIVE | Noted: 2017-06-04

## 2017-06-04 PROBLEM — O41.1290 CHORIOAMNIONITIS (HCC): Status: ACTIVE | Noted: 2017-06-04

## 2017-06-04 RX ORDER — CETIRIZINE HYDROCHLORIDE 10 MG/1
10 TABLET ORAL DAILY
Status: DISCONTINUED | OUTPATIENT
Start: 2017-06-04 | End: 2017-06-05

## 2017-06-04 NOTE — PROGRESS NOTES
Spoke with Dr. Cesar Cowan regarding pt's antibiotics. Ok to discontinue antibiotics after her afternoon doses.

## 2017-06-04 NOTE — PROGRESS NOTES
[facility]    OB/Gyne Post  Progress Note      Fareedtaz Donaldheidy Patient Status:  Inpatient    1980 MRN [id number]   Location [facility] 3SE Attending Traci Shelley MD   Hosp Day # 3 PCP MD Neetu Krause Marginal placenta previa     AMA (advanced maternal age) multigravida 35+     Gestational diabetes mellitus     Irregular contractions     Cholestasis of pregnancy in third trimester     Cholestasis during pregnancy  .     ambulate, continue routine postpar

## 2017-06-05 ENCOUNTER — TELEPHONE (OUTPATIENT)
Dept: OBGYN CLINIC | Facility: CLINIC | Age: 37
End: 2017-06-05

## 2017-06-05 ENCOUNTER — APPOINTMENT (OUTPATIENT)
Dept: CT IMAGING | Facility: HOSPITAL | Age: 37
End: 2017-06-05
Attending: EMERGENCY MEDICINE
Payer: COMMERCIAL

## 2017-06-05 ENCOUNTER — HOSPITAL ENCOUNTER (EMERGENCY)
Facility: HOSPITAL | Age: 37
Discharge: HOME | End: 2017-06-05
Attending: EMERGENCY MEDICINE
Payer: COMMERCIAL

## 2017-06-05 ENCOUNTER — APPOINTMENT (OUTPATIENT)
Dept: ULTRASOUND IMAGING | Facility: HOSPITAL | Age: 37
End: 2017-06-05
Attending: EMERGENCY MEDICINE
Payer: COMMERCIAL

## 2017-06-05 VITALS
HEIGHT: 60 IN | TEMPERATURE: 98 F | RESPIRATION RATE: 16 BRPM | BODY MASS INDEX: 25.52 KG/M2 | DIASTOLIC BLOOD PRESSURE: 66 MMHG | SYSTOLIC BLOOD PRESSURE: 102 MMHG | HEART RATE: 57 BPM | WEIGHT: 130 LBS | OXYGEN SATURATION: 97 %

## 2017-06-05 VITALS
BODY MASS INDEX: 25.52 KG/M2 | SYSTOLIC BLOOD PRESSURE: 117 MMHG | HEART RATE: 63 BPM | WEIGHT: 130 LBS | DIASTOLIC BLOOD PRESSURE: 73 MMHG | OXYGEN SATURATION: 98 % | HEIGHT: 60 IN | TEMPERATURE: 98 F | RESPIRATION RATE: 18 BRPM

## 2017-06-05 DIAGNOSIS — M54.31 SCIATICA OF RIGHT SIDE: ICD-10-CM

## 2017-06-05 DIAGNOSIS — R07.89 CHEST PAIN, ATYPICAL: Primary | ICD-10-CM

## 2017-06-05 PROCEDURE — 85025 COMPLETE CBC W/AUTO DIFF WBC: CPT | Performed by: EMERGENCY MEDICINE

## 2017-06-05 PROCEDURE — 93971 EXTREMITY STUDY: CPT | Performed by: EMERGENCY MEDICINE

## 2017-06-05 PROCEDURE — 99285 EMERGENCY DEPT VISIT HI MDM: CPT

## 2017-06-05 PROCEDURE — 80048 BASIC METABOLIC PNL TOTAL CA: CPT | Performed by: EMERGENCY MEDICINE

## 2017-06-05 PROCEDURE — 93010 ELECTROCARDIOGRAM REPORT: CPT | Performed by: EMERGENCY MEDICINE

## 2017-06-05 PROCEDURE — 71260 CT THORAX DX C+: CPT | Performed by: EMERGENCY MEDICINE

## 2017-06-05 PROCEDURE — 84484 ASSAY OF TROPONIN QUANT: CPT | Performed by: EMERGENCY MEDICINE

## 2017-06-05 PROCEDURE — 36415 COLL VENOUS BLD VENIPUNCTURE: CPT

## 2017-06-05 PROCEDURE — 93005 ELECTROCARDIOGRAM TRACING: CPT

## 2017-06-05 RX ORDER — IBUPROFEN 600 MG/1
600 TABLET ORAL EVERY 6 HOURS PRN
Qty: 30 TABLET | Refills: 0 | Status: SHIPPED | OUTPATIENT
Start: 2017-06-05 | End: 2017-09-05

## 2017-06-05 RX ORDER — PSEUDOEPHEDRINE HCL 30 MG
100 TABLET ORAL 2 TIMES DAILY PRN
Qty: 60 CAPSULE | Refills: 0 | Status: SHIPPED | OUTPATIENT
Start: 2017-06-05 | End: 2017-09-05

## 2017-06-05 NOTE — TELEPHONE ENCOUNTER
Pt had  on 6/3 and reports passing an apple sized blood clot just now while on the toilet. Pt reports her lochia is otherwise fairly light, some occasional small mucusy clots but not much bleeding. Pt changes a pad 3 x a day.  Pt states her right leg is

## 2017-06-05 NOTE — TELEPHONE ENCOUNTER
Per the pt she delivered on 6/3/17, and is has been having on and off chest pains all day. The pt states that she also just passed a very large blood clot. Please advise.

## 2017-06-05 NOTE — PROGRESS NOTES
[facility] - [facility]    OB/Gyne Post  Progress Note      Mansi Guardian Patient Status:  Inpatient    1980 MRN [id number]   Location [facility] 3SE Attending Greta Willis MD   Hosp Day # 4 PCP MD Mino Velasquez

## 2017-06-05 NOTE — PLAN OF CARE
ANXIETY    • Will report anxiety at manageable levels Completed        BREAST FEEDING    • Optimize infant feeding at the breast Completed        INADEQUATE LATCH, SUCK OR SWALLOW    • Demonstrate ability to latch and sustain latch, audible swallowing and

## 2017-06-06 NOTE — ED PROVIDER NOTES
Patient Seen in: [facility] AND [facility] Emergency Department    History   Patient presents with:  Chest Pain Angina (cardiovascular)    Stated Complaint: JUST LEFT FBC CP AND DIZZINESS    HPI    presents with 2 separate complaints.   She just gave birth vagina controlled   • Diabetes Paternal Grandmother    • Cancer Paternal Grandmother      Cancer - lung (cause of death)   • Diabetes Paternal Aunt    • Glaucoma Neg    • Macular degeneration Neg          Smoking Status: Former Smoker                   Packs/Day: seen.  Neurology:  Moving all extremities equally with good coordination. No cranial nerve asymmetry noted. Psychiatric:  Normal affect. Oriented. No unusual behavior. Interacting well.     We will do CT scan to rule out PE as well as blood tests ultra MD  [address]  [phone]    In 2 days  For re-check      Medications Prescribed:  Current Discharge Medication List

## 2017-06-06 NOTE — TELEPHONE ENCOUNTER
Pt states she went to the ER yesterday and was informed that all results were normal except for some inflammation in the lung. Pt will f/u with her PCP. Pt states she feels better today. Pt passed one small blood clot.  Pt advised to call us if she continue

## 2017-06-06 NOTE — ED INITIAL ASSESSMENT (HPI)
Pt c/o chest pressure, R leg pain, went to bathroom and had large blood clot pass at home. Discharged from [facility] this afternoon. Baby was delivered June 3rd, vaginally. Feeling dizzy, lightheaded.

## 2017-06-09 ENCOUNTER — NURSE ONLY (OUTPATIENT)
Dept: LACTATION | Facility: HOSPITAL | Age: 37
End: 2017-06-09
Payer: COMMERCIAL

## 2017-06-09 PROCEDURE — 99212 OFFICE O/P EST SF 10 MIN: CPT

## 2017-06-09 NOTE — PROGRESS NOTES
Mom is here today with infant due to difficulty feeding at breast. Infant breast well after delivery, but mom decided to only bottle feed at that time. Mom states that her \"milk came in\" on Wednesday this week.  At that time, Mom decided to start to Samantha Ed

## 2017-09-05 ENCOUNTER — OFFICE VISIT (OUTPATIENT)
Dept: FAMILY MEDICINE CLINIC | Facility: CLINIC | Age: 37
End: 2017-09-05

## 2017-09-05 VITALS
SYSTOLIC BLOOD PRESSURE: 98 MMHG | TEMPERATURE: 97 F | DIASTOLIC BLOOD PRESSURE: 66 MMHG | HEIGHT: 60 IN | WEIGHT: 127 LBS | HEART RATE: 91 BPM | BODY MASS INDEX: 24.94 KG/M2

## 2017-09-05 DIAGNOSIS — J06.9 ACUTE URI: ICD-10-CM

## 2017-09-05 DIAGNOSIS — H92.01 RIGHT EAR PAIN: ICD-10-CM

## 2017-09-05 PROCEDURE — 99213 OFFICE O/P EST LOW 20 MIN: CPT | Performed by: FAMILY MEDICINE

## 2017-09-05 PROCEDURE — 99212 OFFICE O/P EST SF 10 MIN: CPT | Performed by: FAMILY MEDICINE

## 2017-09-05 RX ORDER — AMOXICILLIN 875 MG/1
875 TABLET, COATED ORAL 2 TIMES DAILY
Qty: 20 TABLET | Refills: 0 | Status: SHIPPED | OUTPATIENT
Start: 2017-09-05 | End: 2018-07-23

## 2017-09-05 NOTE — PROGRESS NOTES
HPI:    Patient ID: Ozzie La is a 29year old female. Pt presents with cold symptoms for 3-4 days. Pt has had right sided ear ache and sore throat. No fevers. Pt also felt some dizziness which resolved. Pt also had had itching of scalp.  NO rashes or

## 2017-10-07 ENCOUNTER — TELEPHONE (OUTPATIENT)
Dept: OBGYN CLINIC | Facility: CLINIC | Age: 37
End: 2017-10-07

## 2017-10-07 NOTE — TELEPHONE ENCOUNTER
Pt states she had unprotected IC last night and is asking for rx for morning after pill. Informed pt Plan B can now be purchased OTC at a [employer] or [employer]. Pt was unaware. Pt verbalized understanding. Encouraged pt to call with any questions or concerns.

## 2018-07-23 ENCOUNTER — HOSPITAL ENCOUNTER (OUTPATIENT)
Dept: GENERAL RADIOLOGY | Age: 38
Discharge: HOME | End: 2018-07-23
Attending: FAMILY MEDICINE
Payer: COMMERCIAL

## 2018-07-23 ENCOUNTER — NURSE TRIAGE (OUTPATIENT)
Dept: OTHER | Age: 38
End: 2018-07-23

## 2018-07-23 ENCOUNTER — OFFICE VISIT (OUTPATIENT)
Dept: FAMILY MEDICINE CLINIC | Facility: CLINIC | Age: 38
End: 2018-07-23

## 2018-07-23 VITALS
WEIGHT: 120.63 LBS | HEIGHT: 60 IN | RESPIRATION RATE: 12 BRPM | DIASTOLIC BLOOD PRESSURE: 66 MMHG | BODY MASS INDEX: 23.68 KG/M2 | TEMPERATURE: 98 F | SYSTOLIC BLOOD PRESSURE: 98 MMHG | HEART RATE: 78 BPM

## 2018-07-23 DIAGNOSIS — R10.31 RLQ ABDOMINAL PAIN: ICD-10-CM

## 2018-07-23 DIAGNOSIS — R10.31 RIGHT GROIN PAIN: ICD-10-CM

## 2018-07-23 DIAGNOSIS — R10.2 PELVIC PAIN: ICD-10-CM

## 2018-07-23 DIAGNOSIS — S76.211A STRAIN OF ADDUCTOR MAGNUS MUSCLE OF RIGHT LOWER EXTREMITY, INITIAL ENCOUNTER: ICD-10-CM

## 2018-07-23 DIAGNOSIS — S76.211A STRAIN OF ADDUCTOR MAGNUS MUSCLE OF RIGHT LOWER EXTREMITY, INITIAL ENCOUNTER: Primary | ICD-10-CM

## 2018-07-23 PROCEDURE — 99213 OFFICE O/P EST LOW 20 MIN: CPT | Performed by: FAMILY MEDICINE

## 2018-07-23 PROCEDURE — 73502 X-RAY EXAM HIP UNI 2-3 VIEWS: CPT | Performed by: FAMILY MEDICINE

## 2018-07-23 PROCEDURE — 74018 RADEX ABDOMEN 1 VIEW: CPT | Performed by: FAMILY MEDICINE

## 2018-07-23 RX ORDER — POLYETHYLENE GLYCOL 3350 17 G/17G
17 POWDER, FOR SOLUTION ORAL DAILY
Qty: 1 BOTTLE | Refills: 3 | Status: SHIPPED | OUTPATIENT
Start: 2018-07-23 | End: 2018-08-15

## 2018-07-23 RX ORDER — NABUMETONE 750 MG/1
750 TABLET, FILM COATED ORAL 2 TIMES DAILY
Qty: 60 TABLET | Refills: 0 | Status: SHIPPED | OUTPATIENT
Start: 2018-07-23 | End: 2018-08-15

## 2018-07-23 NOTE — TELEPHONE ENCOUNTER
Action Requested: Summary for Provider     []  Critical Lab, Recommendations Needed  [] Need Additional Advice  []   FYI    []   Need Orders  [] Need Medications Sent to Pharmacy  []  Other     SUMMARY:   Appointment made for 7/23/18    Patient stated for

## 2018-07-23 NOTE — PROGRESS NOTES
Patient ID: Yonis Jolley is a 40year old female.     HPI  Patient presents with:  Pain: right thigh    Action Requested: Summary for Provider     []  Critical Lab, Recommendations Needed  [] Need Additional Advice  []   FYI    []   Need Orders  [] Need Me appointment with them. For 2 weeks out of the month she may not feel it at all but the other 2 weeks she will always have some type of discomfort in the area.     She has pain when she tries to abduct her right leg and points to the abductor maria esther tendo Physical Exam  Blood pressure 98/66, pulse 78, temperature 98.2 °F (36.8 °C), temperature source Oral, resp. rate 12, height 5' (1.524 m), weight 120 lb 9.6 oz (54.7 kg), last menstrual period 06/23/2018, not currently breastfeeding.   Physical Exam   Con encounter  -     PHYSICAL THERAPY - INTERNAL  -     Nabumetone 750 MG Oral Tab; Take 1 tablet (750 mg total) by mouth 2 (two) times daily. Take with meals. (for pain/inflammation). -     Cancel: XR HIP + PELVIS MIN 4 VIEWS RIGHT (CPT=73503);  Future  X-ray Requested Specialty: Physical Therapy          Number of Visits Requested: 9      Follow up if symptoms persist.  Take medicine (if given) as prescribed. Approach to treatment discussed and patient/family member understands and agrees to plan.      No Foll

## 2018-08-15 ENCOUNTER — OFFICE VISIT (OUTPATIENT)
Dept: OBGYN CLINIC | Facility: CLINIC | Age: 38
End: 2018-08-15

## 2018-08-15 VITALS
SYSTOLIC BLOOD PRESSURE: 94 MMHG | HEIGHT: 60.4 IN | DIASTOLIC BLOOD PRESSURE: 61 MMHG | HEART RATE: 86 BPM | BODY MASS INDEX: 23.06 KG/M2 | WEIGHT: 119 LBS

## 2018-08-15 DIAGNOSIS — Z01.419 ENCOUNTER FOR GYNECOLOGICAL EXAMINATION: Primary | ICD-10-CM

## 2018-08-15 PROCEDURE — 99395 PREV VISIT EST AGE 18-39: CPT | Performed by: OBSTETRICS & GYNECOLOGY

## 2018-08-16 LAB — HPV I/H RISK 1 DNA SPEC QL NAA+PROBE: NEGATIVE

## 2018-08-16 NOTE — PROGRESS NOTES
Aure Bull is a 40year old female [id number] Patient's last menstrual period was 2018. here for annual exam.       Last seen in pregnancy . Had  with NJG in 2017. Last pap 2016 normal with neg HPV. But pt wants pap today.     Mense or numbness. Psychiatric: denies depression or anxiety. Endocrine:   denies excessive thirst or urination. Heme/Lymph:  easy bruising or bleeding.     PHYSICAL EXAM:   BP 94/61   Pulse 86   Ht 5' 0.4\" (1.534 m)   Wt 119 lb (54 kg)   LMP 07/31/2018   BMI

## 2018-08-17 LAB — LAST PAP RESULT: NORMAL

## 2018-09-04 ENCOUNTER — APPOINTMENT (OUTPATIENT)
Dept: LAB | Facility: HOSPITAL | Age: 38
End: 2018-09-04
Attending: OBSTETRICS & GYNECOLOGY
Payer: COMMERCIAL

## 2018-09-04 ENCOUNTER — TELEPHONE (OUTPATIENT)
Dept: OBGYN CLINIC | Facility: CLINIC | Age: 38
End: 2018-09-04

## 2018-09-04 DIAGNOSIS — Z32.00 UNCONFIRMED PREGNANCY: Primary | ICD-10-CM

## 2018-09-04 DIAGNOSIS — Z32.00 UNCONFIRMED PREGNANCY: ICD-10-CM

## 2018-09-04 LAB — HCG SERPL QL: NEGATIVE

## 2018-09-04 PROCEDURE — 84703 CHORIONIC GONADOTROPIN ASSAY: CPT

## 2018-09-04 PROCEDURE — 36415 COLL VENOUS BLD VENIPUNCTURE: CPT

## 2018-09-04 NOTE — TELEPHONE ENCOUNTER
Pt informed of [facility] recs below. Pt stated that she has taken UPTs in the past that have been negative and she was actually pregnant. Pt stated she would like to complete blood preg test today and call for results tomorrow. Order placed.

## 2018-09-04 NOTE — TELEPHONE ENCOUNTER
Pt informed that Kellee Mendes is out of the office until tomorrow AM and we are waiting for recs. Pt asking if there is another doctor that can order pregnancy test because she \"wants to know today\". Message to [facility] (on call).

## 2018-09-04 NOTE — TELEPHONE ENCOUNTER
Pt states that her period is late and she had unprotected IC   Last period was 7/31/18. Pt states that her period usually comes every 30 days. Pt states only once in Feb it was late. Pt asking to do a blood test at the lab.   Sent to [facility] Stevens [id number] ok for hcg at the

## 2018-09-05 ENCOUNTER — TELEPHONE (OUTPATIENT)
Dept: OBGYN CLINIC | Facility: CLINIC | Age: 38
End: 2018-09-05

## 2018-09-05 NOTE — TELEPHONE ENCOUNTER
APPT SCHEDULED FOR TOMORROW FOR AVTAR IUD INSERTION. SUGGESTED TAKING IBUPROFEN 600MG 30-60 MINUTES BEFORE THE APPT. PT AWARE WE WILL CONFIRM WITH JLK IF CYTOTEC IS NEEDED TONIGHT.   WILL CALL PT BACK ONLY IF SHE NEEDS RX OTHERWISE SHE IS TO KEEP APPT ERWIN

## 2019-04-17 ENCOUNTER — OFFICE VISIT (OUTPATIENT)
Dept: FAMILY MEDICINE CLINIC | Facility: CLINIC | Age: 39
End: 2019-04-17

## 2019-04-17 VITALS
HEART RATE: 91 BPM | OXYGEN SATURATION: 97 % | RESPIRATION RATE: 20 BRPM | BODY MASS INDEX: 24.15 KG/M2 | SYSTOLIC BLOOD PRESSURE: 98 MMHG | WEIGHT: 123 LBS | DIASTOLIC BLOOD PRESSURE: 62 MMHG | TEMPERATURE: 98 F | HEIGHT: 60 IN

## 2019-04-17 DIAGNOSIS — J02.9 SORE THROAT: ICD-10-CM

## 2019-04-17 DIAGNOSIS — J06.9 ACUTE URI: ICD-10-CM

## 2019-04-17 PROCEDURE — 87880 STREP A ASSAY W/OPTIC: CPT | Performed by: FAMILY MEDICINE

## 2019-04-17 PROCEDURE — 99213 OFFICE O/P EST LOW 20 MIN: CPT | Performed by: FAMILY MEDICINE

## 2019-04-17 PROCEDURE — 99212 OFFICE O/P EST SF 10 MIN: CPT | Performed by: FAMILY MEDICINE

## 2019-04-17 RX ORDER — AMOXICILLIN 875 MG/1
875 TABLET, COATED ORAL 2 TIMES DAILY
Qty: 20 TABLET | Refills: 0 | Status: SHIPPED | OUTPATIENT
Start: 2019-04-17 | End: 2020-09-30 | Stop reason: ALTCHOICE

## 2019-04-17 NOTE — PROGRESS NOTES
HPI:    Patient ID: Shahnaz Hsieh is a 41year old female. Pt presents with cold symptoms for about a week. Pt has had congestion, cough with phlegm, sore throat. No fevers. Pt has tried otc remedies without relief. Pt states no sick contacts.        [facility] & Referrals:  None       SR#[id number]

## 2019-04-23 ENCOUNTER — TELEPHONE (OUTPATIENT)
Dept: OTHER | Age: 39
End: 2019-04-23

## 2019-04-23 NOTE — TELEPHONE ENCOUNTER
Message noted and chart reviewed. Pt has strep testing that was normal; can stop amoxicillin if not helping. If starting to get some pink eye symptoms; can try otc eye drops to see if this helps.  If need for alternative abx; can call in a zithromax as dire

## 2019-04-23 NOTE — TELEPHONE ENCOUNTER
Patient called and informed with understanding. She also stated that she would like her \"minerals\" in her body check for after her pregnancy she has been tired lately. Last labs were in 2017. No physical appointment noted.   The call was transferred

## 2019-04-23 NOTE — TELEPHONE ENCOUNTER
Pt states had OV with Dr Mary Ellen Vila 4/17/19 for URI. Pt states has been on Amoxicillin for six days and no relief. Pt states sore throat and cough still persisting. Pt states yesterday also noticed redness in corner of right eye and now spreading to left.

## 2020-07-07 ENCOUNTER — TELEMEDICINE (OUTPATIENT)
Dept: FAMILY MEDICINE CLINIC | Facility: CLINIC | Age: 40
End: 2020-07-07

## 2020-07-07 DIAGNOSIS — L98.9 SKIN LESION OF FACE: ICD-10-CM

## 2020-07-07 PROCEDURE — 99212 OFFICE O/P EST SF 10 MIN: CPT | Performed by: FAMILY MEDICINE

## 2020-07-07 NOTE — PROGRESS NOTES
HPI:    Patient ID: Ozzie La is a 46year old female. Virtual Telephone Check-In    Ozzie La verbally consents to a Virtual/Telephone Check-In visit on 07/07/20.   Patient has been referred to the [facility] website at [email or web address] to marie

## 2020-07-08 ENCOUNTER — PATIENT MESSAGE (OUTPATIENT)
Dept: FAMILY MEDICINE CLINIC | Facility: CLINIC | Age: 40
End: 2020-07-08

## 2020-07-09 NOTE — TELEPHONE ENCOUNTER
To: Cem Carlin      From: Jhoana Gracia RN      Created: 7/9/2020 11:32 AM        Melony Ibrahim, The most recent referral information is what is below.  If your question was not about a dermatologist, please let us know more information so we may help       Department     Address     City     Phone        [facility]     [address] [facility]     [phone]

## 2020-07-09 NOTE — TELEPHONE ENCOUNTER
From: Justine Zelaya  To: Rosita Merritt MD  Sent: 7/8/2020 10:21 PM CDT  Subject: Visit Claudia Artist Dr. Deep Mensah  Not sure if I deleted my referrals for [facility] and Myself. Did it go through? Thanks again.   Levi Lund

## 2020-08-18 NOTE — TELEPHONE ENCOUNTER
Patient called in requesting a referral for psychologist for counseling. She denies to go into detail, but prefers a female provider (specifically of Latin background if possible).      CSS stated that she may be asked to see Dr. Tere Zimmerman for clarification o

## 2020-08-22 NOTE — TELEPHONE ENCOUNTER
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 I want to do everything perfect. I want help with this without taking medication.  I

## 2020-08-22 NOTE — TELEPHONE ENCOUNTER
Referral request noted. Referral approved, generated for integrative medicine and sent to [facility].

## 2020-08-24 NOTE — TELEPHONE ENCOUNTER
Hi Dr. James Wallace,    I reached out to [facility] in regards to Integrative Medicine. Please see below. Thank you  Mala Arthur    Nutrition is only covered as a single thing, correct?   If it is included in a package with behavioral therapy, and other holistic treatmen

## 2020-08-24 NOTE — TELEPHONE ENCOUNTER
Pt called left message on her voicemail informing her of Dr. Kaleigh Franks and [address] [facility] response below. If, Pt call please advise information from [facility].   Pt was told to contact her insurance if, need

## 2020-09-30 ENCOUNTER — OFFICE VISIT (OUTPATIENT)
Dept: DERMATOLOGY CLINIC | Facility: CLINIC | Age: 40
End: 2020-09-30

## 2020-09-30 DIAGNOSIS — D23.30 BENIGN NEOPLASM OF SKIN OF FACE: ICD-10-CM

## 2020-09-30 DIAGNOSIS — D23.4 BENIGN NEOPLASM OF SCALP AND SKIN OF NECK: ICD-10-CM

## 2020-09-30 DIAGNOSIS — D22.9 MULTIPLE NEVI: Primary | ICD-10-CM

## 2020-09-30 DIAGNOSIS — D23.5 BENIGN NEOPLASM OF SKIN OF TRUNK, EXCEPT SCROTUM: ICD-10-CM

## 2020-09-30 PROCEDURE — 99202 OFFICE O/P NEW SF 15 MIN: CPT | Performed by: DERMATOLOGY

## 2020-10-05 NOTE — PROGRESS NOTES
Charan Connor is a 45year old female. HPI:     CC:  Patient presents with:  Lesion: new patient, pt presenting with raised flesh color lesion on L cheek . Patient c/o having lesion for 20 years and has changed color .  Patient denies any hx of sc   Lesion: Medical: Not on file        Non-medical: Not on file    Tobacco Use      Smoking status: Former Smoker        Types: Cigarettes      Smokeless tobacco: Never Used    Substance and Sexual Activity      Alcohol use: Yes        Comment: Formerly - wine oc Macular degeneration Neg        There were no vitals filed for this visit. HPI:  Patient presents with:  Lesion: new patient, pt presenting with raised flesh color lesion on L cheek . Patient c/o having lesion for 20 years and has changed color .  Patient Multiple nevi  (primary encounter diagnosis)  Benign neoplasm of skin of face  Benign neoplasm of scalp and skin of neck  Benign neoplasm of skin of trunk, except scrotum    Left cheek 6 mm flesh-colored papule dome-shaped. Enlarging per patient.

## 2020-11-18 ENCOUNTER — VIRTUAL PHONE E/M (OUTPATIENT)
Dept: FAMILY MEDICINE CLINIC | Facility: CLINIC | Age: 40
End: 2020-11-18

## 2020-11-18 DIAGNOSIS — Z63.5 MARITAL DISRUPTION INVOLVING DIVORCE: Primary | ICD-10-CM

## 2020-11-18 PROCEDURE — 99213 OFFICE O/P EST LOW 20 MIN: CPT | Performed by: FAMILY MEDICINE

## 2020-11-18 SDOH — SOCIAL STABILITY - SOCIAL INSECURITY: DISRUPTION OF FAMILY BY SEPARATION AND DIVORCE: Z63.5

## 2020-11-18 NOTE — PROGRESS NOTES
HPI:    Patient ID: Omero Bowling is a 45year old female. Virtual Telephone Check-In    Omero Bowling verbally consents to a Virtual/Telephone Check-In visit on 11/18/20.   Patient has been referred to the [facility] website at [email or web address] to marie Visit:  Requested Prescriptions      No prescriptions requested or ordered in this encounter       Imaging & Referrals:  OP REFERRAL TO [facility]  PSYCHOLOGY - INTERNAL       NP#[id number]

## 2021-03-19 ENCOUNTER — NURSE TRIAGE (OUTPATIENT)
Dept: FAMILY MEDICINE CLINIC | Facility: CLINIC | Age: 41
End: 2021-03-19

## 2021-03-19 NOTE — TELEPHONE ENCOUNTER
C/o groin pain, between inner part of private area and inner thigh. No numbness. Notices some inflammation. This happened in past after she had delivered baby 2017. It has been uncomfortable and asking for appt. Patient needs evaluation.   appt

## 2021-03-22 ENCOUNTER — OFFICE VISIT (OUTPATIENT)
Dept: FAMILY MEDICINE CLINIC | Facility: CLINIC | Age: 41
End: 2021-03-22

## 2021-03-22 ENCOUNTER — LAB ENCOUNTER (OUTPATIENT)
Dept: LAB | Age: 41
End: 2021-03-22
Attending: STUDENT IN AN ORGANIZED HEALTH CARE EDUCATION/TRAINING PROGRAM
Payer: COMMERCIAL

## 2021-03-22 VITALS
HEIGHT: 60 IN | SYSTOLIC BLOOD PRESSURE: 96 MMHG | HEART RATE: 76 BPM | DIASTOLIC BLOOD PRESSURE: 63 MMHG | WEIGHT: 126 LBS | BODY MASS INDEX: 24.74 KG/M2

## 2021-03-22 DIAGNOSIS — Z00.00 WELL ADULT EXAM: ICD-10-CM

## 2021-03-22 DIAGNOSIS — M25.551 CHRONIC RIGHT HIP PAIN: Primary | ICD-10-CM

## 2021-03-22 DIAGNOSIS — G89.29 CHRONIC RIGHT HIP PAIN: Primary | ICD-10-CM

## 2021-03-22 LAB
ALBUMIN SERPL-MCNC: 3.9 G/DL (ref 3.4–5)
ALBUMIN/GLOB SERPL: 1 {RATIO} (ref 1–2)
ALP LIVER SERPL-CCNC: 56 U/L
ALT SERPL-CCNC: 17 U/L
ANION GAP SERPL CALC-SCNC: 3 MMOL/L (ref 0–18)
AST SERPL-CCNC: 11 U/L (ref 15–37)
BILIRUB SERPL-MCNC: 1.1 MG/DL (ref 0.1–2)
BUN BLD-MCNC: 11 MG/DL (ref 7–18)
BUN/CREAT SERPL: 13.4 (ref 10–20)
CALCIUM BLD-MCNC: 8.9 MG/DL (ref 8.5–10.1)
CHLORIDE SERPL-SCNC: 108 MMOL/L (ref 98–112)
CHOLEST SMN-MCNC: 246 MG/DL (ref ?–200)
CO2 SERPL-SCNC: 28 MMOL/L (ref 21–32)
CREAT BLD-MCNC: 0.82 MG/DL
DEPRECATED RDW RBC AUTO: 44 FL (ref 35.1–46.3)
ERYTHROCYTE [DISTWIDTH] IN BLOOD BY AUTOMATED COUNT: 12.7 % (ref 11–15)
GLOBULIN PLAS-MCNC: 3.9 G/DL (ref 2.8–4.4)
GLUCOSE BLD-MCNC: 90 MG/DL (ref 70–99)
HCT VFR BLD AUTO: 39.9 %
HDLC SERPL-MCNC: 59 MG/DL (ref 40–59)
HGB BLD-MCNC: 13.2 G/DL
LDLC SERPL CALC-MCNC: 173 MG/DL (ref ?–100)
M PROTEIN MFR SERPL ELPH: 7.8 G/DL (ref 6.4–8.2)
MCH RBC QN AUTO: 31.2 PG (ref 26–34)
MCHC RBC AUTO-ENTMCNC: 33.1 G/DL (ref 31–37)
MCV RBC AUTO: 94.3 FL
NONHDLC SERPL-MCNC: 187 MG/DL (ref ?–130)
OSMOLALITY SERPL CALC.SUM OF ELEC: 287 MOSM/KG (ref 275–295)
PATIENT FASTING Y/N/NP: YES
PATIENT FASTING Y/N/NP: YES
PLATELET # BLD AUTO: 213 10(3)UL (ref 150–450)
POTASSIUM SERPL-SCNC: 3.8 MMOL/L (ref 3.5–5.1)
RBC # BLD AUTO: 4.23 X10(6)UL
SODIUM SERPL-SCNC: 139 MMOL/L (ref 136–145)
TRIGL SERPL-MCNC: 71 MG/DL (ref 30–149)
TSI SER-ACNC: 3.59 MIU/ML (ref 0.36–3.74)
VIT B12 SERPL-MCNC: 1501 PG/ML (ref 193–986)
VLDLC SERPL CALC-MCNC: 14 MG/DL (ref 0–30)
WBC # BLD AUTO: 3.8 X10(3) UL (ref 4–11)

## 2021-03-22 PROCEDURE — 82607 VITAMIN B-12: CPT

## 2021-03-22 PROCEDURE — 82306 VITAMIN D 25 HYDROXY: CPT

## 2021-03-22 PROCEDURE — 99213 OFFICE O/P EST LOW 20 MIN: CPT | Performed by: STUDENT IN AN ORGANIZED HEALTH CARE EDUCATION/TRAINING PROGRAM

## 2021-03-22 PROCEDURE — 3008F BODY MASS INDEX DOCD: CPT | Performed by: STUDENT IN AN ORGANIZED HEALTH CARE EDUCATION/TRAINING PROGRAM

## 2021-03-22 PROCEDURE — 3078F DIAST BP <80 MM HG: CPT | Performed by: STUDENT IN AN ORGANIZED HEALTH CARE EDUCATION/TRAINING PROGRAM

## 2021-03-22 PROCEDURE — 80061 LIPID PANEL: CPT

## 2021-03-22 PROCEDURE — 3074F SYST BP LT 130 MM HG: CPT | Performed by: STUDENT IN AN ORGANIZED HEALTH CARE EDUCATION/TRAINING PROGRAM

## 2021-03-22 PROCEDURE — 84443 ASSAY THYROID STIM HORMONE: CPT

## 2021-03-22 PROCEDURE — 80053 COMPREHEN METABOLIC PANEL: CPT

## 2021-03-22 PROCEDURE — 85027 COMPLETE CBC AUTOMATED: CPT

## 2021-03-22 PROCEDURE — 36415 COLL VENOUS BLD VENIPUNCTURE: CPT

## 2021-03-22 NOTE — PROGRESS NOTES
HPI:    Patient ID: Uma Castro is a 45year old female. HPI  Pt presenting with Right hip/thigh pain. She complains of ongoing issues since delivering her last child 3 years ago.  She describes pain in her Right hip/groin radiating down her Right thigh groin  Musculoskeletal:      Cervical back: Normal range of motion and neck supple. No muscular tenderness. Right hip: No tenderness, bony tenderness or crepitus. Decreased range of motion (2/2 tight hamstring and hip flexors). Normal strength.       L

## 2021-03-24 LAB — 25(OH)D3 SERPL-MCNC: 47 NG/ML (ref 30–100)

## 2021-03-25 ENCOUNTER — HOSPITAL ENCOUNTER (OUTPATIENT)
Dept: GENERAL RADIOLOGY | Facility: HOSPITAL | Age: 41
Discharge: HOME | End: 2021-03-25
Attending: STUDENT IN AN ORGANIZED HEALTH CARE EDUCATION/TRAINING PROGRAM
Payer: COMMERCIAL

## 2021-03-25 DIAGNOSIS — G89.29 CHRONIC RIGHT HIP PAIN: ICD-10-CM

## 2021-03-25 DIAGNOSIS — M25.551 CHRONIC RIGHT HIP PAIN: ICD-10-CM

## 2021-03-25 PROCEDURE — 73502 X-RAY EXAM HIP UNI 2-3 VIEWS: CPT | Performed by: STUDENT IN AN ORGANIZED HEALTH CARE EDUCATION/TRAINING PROGRAM

## 2021-04-28 ENCOUNTER — IMMUNIZATION (OUTPATIENT)
Dept: LAB | Facility: HOSPITAL | Age: 41
End: 2021-04-28
Attending: EMERGENCY MEDICINE
Payer: COMMERCIAL

## 2021-04-28 DIAGNOSIS — Z23 NEED FOR VACCINATION: Primary | ICD-10-CM

## 2021-04-28 PROCEDURE — 0011A SARSCOV2 VAC 100MCG/0.5ML IM: CPT

## 2021-05-26 ENCOUNTER — IMMUNIZATION (OUTPATIENT)
Dept: LAB | Facility: HOSPITAL | Age: 41
End: 2021-05-26
Attending: EMERGENCY MEDICINE
Payer: COMMERCIAL

## 2021-05-26 DIAGNOSIS — Z23 NEED FOR VACCINATION: Primary | ICD-10-CM

## 2021-05-26 PROCEDURE — 0012A SARSCOV2 VAC 100MCG/0.5ML IM: CPT

## 2021-06-30 ENCOUNTER — OFFICE VISIT (OUTPATIENT)
Dept: FAMILY MEDICINE CLINIC | Facility: CLINIC | Age: 41
End: 2021-06-30

## 2021-06-30 VITALS
RESPIRATION RATE: 16 BRPM | SYSTOLIC BLOOD PRESSURE: 105 MMHG | HEIGHT: 61 IN | DIASTOLIC BLOOD PRESSURE: 65 MMHG | BODY MASS INDEX: 22.66 KG/M2 | HEART RATE: 82 BPM | TEMPERATURE: 98 F | OXYGEN SATURATION: 98 % | WEIGHT: 120 LBS

## 2021-06-30 DIAGNOSIS — H92.02 OTALGIA OF LEFT EAR: Primary | ICD-10-CM

## 2021-06-30 PROCEDURE — 3074F SYST BP LT 130 MM HG: CPT | Performed by: NURSE PRACTITIONER

## 2021-06-30 PROCEDURE — 99202 OFFICE O/P NEW SF 15 MIN: CPT | Performed by: NURSE PRACTITIONER

## 2021-06-30 PROCEDURE — 3078F DIAST BP <80 MM HG: CPT | Performed by: NURSE PRACTITIONER

## 2021-06-30 PROCEDURE — 3008F BODY MASS INDEX DOCD: CPT | Performed by: NURSE PRACTITIONER

## 2021-06-30 NOTE — PATIENT INSTRUCTIONS
Earache, No Infection (Adult)   Earaches can happen without an infection. They can occur when air and fluid build up behind the eardrum. They may cause a feeling of fullness and discomfort. They may also impair hearing.  This is called otitis media with e prescribed. If you have chronic liver or kidney disease or ever had a stomach ulcer or GI bleeding, talk with your healthcare provider before using any medicines. · Aspirin should never be used in anyone younger than age 25 who has a fever.  It may cause s Your healthcare provider can advise different pain relief methods as part of your treatment. These may include medicines and certain types of therapy, such as massage or gentle exercise.    Using medicines    Medicines may be prescribed to treat TMD. Other anesthetic. This can ease pain. This may be given as an injection by your dentist.  Treating the joint  Therapy may focus directly on the TMJ.  There are different ways to treat the joint:  · A self-care program. This helps you treat and manage symptoms on

## 2021-06-30 NOTE — PROGRESS NOTES
CHIEF COMPLAINT:   Patient presents with:  Ear Problem: My left ear hurts - Entered by patient      HPI:   Nicky Pitts is a 45year old female who presents to clinic today with complaints of left ear pain. Has had for 2  days.  Pain is described as mild nasal mucosa pink and noninflamed  THROAT: oral mucosa pink, moist. Posterior pharynx is not erythematous or injected. No exudates. NECK: supple, non-tender  LUNGS: clear to auscultation bilaterally. Breathing is non labored.   CARDIO: RRR without murmur drops help if there is pain. Decongestants and antihistamines sometimes help. Antibiotics don't help since there is no infection. Your healthcare provider may give you a nasal spray to help reduce swelling in the nose and eustachian tube.  This can allow th Fever of 100.4°F (38°C) or higher, or as directed by your healthcare provider  · Fluid or blood draining from the ear  · Headache or sinus pain  · Stiff neck  · Unusual drowsiness or confusion  Omid last reviewed this educational content on 12/1/2019 higher dosages, these medicines are used to treat depression. Given at low dosages, antidepressants may help ease TMD symptoms. They can reduce muscle pain. They also raise the level of serotonin, a body chemical that improves sleep.  This in turn can decre seals at the top. Wrap the bag in a clean, thin towel or cloth. Never put ice or a cold pack directly on the skin. · Massage and gentle manipulation.  As described above.   Omid last reviewed this educational content on 6/1/2020  © [id number] [employer]

## 2021-09-14 ENCOUNTER — NURSE TRIAGE (OUTPATIENT)
Dept: FAMILY MEDICINE CLINIC | Facility: CLINIC | Age: 41
End: 2021-09-14

## 2021-09-14 NOTE — TELEPHONE ENCOUNTER
Action Requested: Summary for Provider     []  Critical Lab, Recommendations Needed  [] Need Additional Advice  [x]   FYI    []   Need Orders  [] Need Medications Sent to Pharmacy  []  Other     SUMMARY: c/o of abdomen right side pain below the belly butto

## 2021-09-15 NOTE — TELEPHONE ENCOUNTER
Appointment Information   Name: Osmar Heath MRN: [id number]   Date: 9/15/2021 Status: Can   Appt Time: 9:20 AM Length: 10   Visit Type: EXAM - ESTABLISHED [7364] Copay: $10.00   Provider: Elise Marroquin MD Department: [facility]   Referral Number:

## 2021-10-28 ENCOUNTER — VIRTUAL PHONE E/M (OUTPATIENT)
Dept: FAMILY MEDICINE CLINIC | Facility: CLINIC | Age: 41
End: 2021-10-28

## 2021-10-28 DIAGNOSIS — M79.604 PAIN OF RIGHT LOWER EXTREMITY: Primary | ICD-10-CM

## 2021-10-28 PROCEDURE — 99213 OFFICE O/P EST LOW 20 MIN: CPT | Performed by: FAMILY MEDICINE

## 2021-10-28 NOTE — PROGRESS NOTES
Subjective:   Patient ID: Uma Castro is a 37year old female. Virtual Telephone Check-In    Uma Castro verbally consents to a Virtual/Telephone Check-In visit on 10/28/21.   Patient has been referred to the [facility] website at [email or web address] to any sig symptoms. Follow up and further management after testing. No orders of the defined types were placed in this encounter.       Meds This Visit:  Requested Prescriptions      No prescriptions requested or ordered in this encounter       Imaging &

## 2021-11-03 ENCOUNTER — HOSPITAL ENCOUNTER (OUTPATIENT)
Dept: ULTRASOUND IMAGING | Facility: HOSPITAL | Age: 41
Discharge: HOME | End: 2021-11-03
Attending: FAMILY MEDICINE
Payer: COMMERCIAL

## 2021-11-03 DIAGNOSIS — M79.604 PAIN OF RIGHT LOWER EXTREMITY: ICD-10-CM

## 2021-11-03 PROCEDURE — 93971 EXTREMITY STUDY: CPT | Performed by: FAMILY MEDICINE

## 2022-08-19 ENCOUNTER — TELEPHONE (OUTPATIENT)
Dept: FAMILY MEDICINE CLINIC | Facility: CLINIC | Age: 42
End: 2022-08-19

## 2022-08-19 DIAGNOSIS — L98.9 SKIN LESION: Primary | ICD-10-CM

## 2022-08-19 NOTE — TELEPHONE ENCOUNTER
Patient called requesting another referral for dermatology. Patient states she had one before but it .

## 2022-09-07 ENCOUNTER — OFFICE VISIT (OUTPATIENT)
Dept: FAMILY MEDICINE CLINIC | Facility: CLINIC | Age: 42
End: 2022-09-07
Payer: COMMERCIAL

## 2022-09-07 VITALS
HEIGHT: 61 IN | HEART RATE: 85 BPM | SYSTOLIC BLOOD PRESSURE: 93 MMHG | DIASTOLIC BLOOD PRESSURE: 59 MMHG | WEIGHT: 122.81 LBS | BODY MASS INDEX: 23.19 KG/M2

## 2022-09-07 DIAGNOSIS — Z86.39 HISTORY OF HYPOTHYROIDISM: ICD-10-CM

## 2022-09-07 DIAGNOSIS — D22.39 NEVUS OF CHEEK: ICD-10-CM

## 2022-09-07 DIAGNOSIS — Z12.31 BREAST CANCER SCREENING BY MAMMOGRAM: ICD-10-CM

## 2022-09-07 DIAGNOSIS — Z00.00 ROUTINE PHYSICAL EXAMINATION: Primary | ICD-10-CM

## 2022-09-07 PROCEDURE — 99386 PREV VISIT NEW AGE 40-64: CPT

## 2022-09-07 PROCEDURE — 3078F DIAST BP <80 MM HG: CPT

## 2022-09-07 PROCEDURE — 3074F SYST BP LT 130 MM HG: CPT

## 2022-09-07 PROCEDURE — 3008F BODY MASS INDEX DOCD: CPT

## 2022-09-08 ENCOUNTER — LAB ENCOUNTER (OUTPATIENT)
Dept: LAB | Age: 42
End: 2022-09-08
Payer: COMMERCIAL

## 2022-09-08 DIAGNOSIS — Z86.39 HISTORY OF HYPOTHYROIDISM: ICD-10-CM

## 2022-09-08 DIAGNOSIS — Z00.00 ROUTINE PHYSICAL EXAMINATION: ICD-10-CM

## 2022-09-08 LAB
ALBUMIN SERPL-MCNC: 3.7 G/DL (ref 3.4–5)
ALBUMIN/GLOB SERPL: 1 {RATIO} (ref 1–2)
ALP LIVER SERPL-CCNC: 57 U/L
ALT SERPL-CCNC: 17 U/L
ANION GAP SERPL CALC-SCNC: 6 MMOL/L (ref 0–18)
AST SERPL-CCNC: 15 U/L (ref 15–37)
BASOPHILS # BLD AUTO: 0.04 X10(3) UL (ref 0–0.2)
BASOPHILS NFR BLD AUTO: 0.9 %
BILIRUB SERPL-MCNC: 1.4 MG/DL (ref 0.1–2)
BUN BLD-MCNC: 12 MG/DL (ref 7–18)
BUN/CREAT SERPL: 14.6 (ref 10–20)
CALCIUM BLD-MCNC: 9.2 MG/DL (ref 8.5–10.1)
CHLORIDE SERPL-SCNC: 107 MMOL/L (ref 98–112)
CHOLEST SERPL-MCNC: 257 MG/DL (ref ?–200)
CO2 SERPL-SCNC: 27 MMOL/L (ref 21–32)
CREAT BLD-MCNC: 0.82 MG/DL
DEPRECATED RDW RBC AUTO: 44.6 FL (ref 35.1–46.3)
EOSINOPHIL # BLD AUTO: 0.08 X10(3) UL (ref 0–0.7)
EOSINOPHIL NFR BLD AUTO: 1.7 %
ERYTHROCYTE [DISTWIDTH] IN BLOOD BY AUTOMATED COUNT: 12.9 % (ref 11–15)
FASTING PATIENT LIPID ANSWER: YES
FASTING STATUS PATIENT QL REPORTED: YES
GFR SERPLBLD BASED ON 1.73 SQ M-ARVRAT: 93 ML/MIN/1.73M2 (ref 60–?)
GLOBULIN PLAS-MCNC: 3.6 G/DL (ref 2.8–4.4)
GLUCOSE BLD-MCNC: 89 MG/DL (ref 70–99)
HCT VFR BLD AUTO: 39.5 %
HDLC SERPL-MCNC: 61 MG/DL (ref 40–59)
HGB BLD-MCNC: 12.8 G/DL
IMM GRANULOCYTES # BLD AUTO: 0.01 X10(3) UL (ref 0–1)
IMM GRANULOCYTES NFR BLD: 0.2 %
LDLC SERPL CALC-MCNC: 184 MG/DL (ref ?–100)
LYMPHOCYTES # BLD AUTO: 1.71 X10(3) UL (ref 1–4)
LYMPHOCYTES NFR BLD AUTO: 36.9 %
MCH RBC QN AUTO: 30.6 PG (ref 26–34)
MCHC RBC AUTO-ENTMCNC: 32.4 G/DL (ref 31–37)
MCV RBC AUTO: 94.5 FL
MONOCYTES # BLD AUTO: 0.34 X10(3) UL (ref 0.1–1)
MONOCYTES NFR BLD AUTO: 7.3 %
NEUTROPHILS # BLD AUTO: 2.45 X10 (3) UL (ref 1.5–7.7)
NEUTROPHILS # BLD AUTO: 2.45 X10(3) UL (ref 1.5–7.7)
NEUTROPHILS NFR BLD AUTO: 53 %
NONHDLC SERPL-MCNC: 196 MG/DL (ref ?–130)
OSMOLALITY SERPL CALC.SUM OF ELEC: 289 MOSM/KG (ref 275–295)
PLATELET # BLD AUTO: 224 10(3)UL (ref 150–450)
POTASSIUM SERPL-SCNC: 3.8 MMOL/L (ref 3.5–5.1)
PROT SERPL-MCNC: 7.3 G/DL (ref 6.4–8.2)
RBC # BLD AUTO: 4.18 X10(6)UL
SODIUM SERPL-SCNC: 140 MMOL/L (ref 136–145)
TRIGL SERPL-MCNC: 74 MG/DL (ref 30–149)
TSI SER-ACNC: 3.01 MIU/ML (ref 0.36–3.74)
VLDLC SERPL CALC-MCNC: 15 MG/DL (ref 0–30)
WBC # BLD AUTO: 4.6 X10(3) UL (ref 4–11)

## 2022-09-08 PROCEDURE — 84443 ASSAY THYROID STIM HORMONE: CPT

## 2022-09-08 PROCEDURE — 36415 COLL VENOUS BLD VENIPUNCTURE: CPT

## 2022-09-08 PROCEDURE — 80061 LIPID PANEL: CPT

## 2022-09-08 PROCEDURE — 80053 COMPREHEN METABOLIC PANEL: CPT

## 2022-09-08 PROCEDURE — 85025 COMPLETE CBC W/AUTO DIFF WBC: CPT

## 2023-03-07 ENCOUNTER — TELEPHONE (OUTPATIENT)
Dept: FAMILY MEDICINE CLINIC | Facility: CLINIC | Age: 43
End: 2023-03-07

## 2023-03-07 NOTE — TELEPHONE ENCOUNTER
Patient called requesting a copy of her last physical to be mailed to her. She tried to speak with medical records and stated they would return her call in 2 days. She requested for a call back with clarification.

## 2023-03-20 ENCOUNTER — TELEPHONE (OUTPATIENT)
Dept: FAMILY MEDICINE CLINIC | Facility: CLINIC | Age: 43
End: 2023-03-20

## 2023-03-20 ENCOUNTER — LAB ENCOUNTER (OUTPATIENT)
Dept: LAB | Age: 43
End: 2023-03-20
Attending: FAMILY MEDICINE
Payer: COMMERCIAL

## 2023-03-20 DIAGNOSIS — Z11.1 SCREENING-PULMONARY TB: Primary | ICD-10-CM

## 2023-03-20 DIAGNOSIS — Z11.1 SCREENING-PULMONARY TB: ICD-10-CM

## 2023-03-20 PROCEDURE — 86480 TB TEST CELL IMMUN MEASURE: CPT

## 2023-03-20 PROCEDURE — 36415 COLL VENOUS BLD VENIPUNCTURE: CPT

## 2023-03-20 NOTE — TELEPHONE ENCOUNTER
Good Morning Leo PERRY, Pt is coming in for [place] for TB Test but, there's no order in the system . Pt last seen you for her Physical.  Please place order.   thanks

## 2023-03-22 ENCOUNTER — TELEPHONE (OUTPATIENT)
Dept: FAMILY MEDICINE CLINIC | Facility: CLINIC | Age: 43
End: 2023-03-22

## 2023-03-22 LAB
M TB IFN-G CD4+ T-CELLS BLD-ACNC: 0.03 IU/ML
M TB TUBERC IFN-G BLD QL: NEGATIVE
M TB TUBERC IGNF/MITOGEN IGNF CONTROL: >10 IU/ML
QFT TB1 AG MINUS NIL: 0 IU/ML
QFT TB2 AG MINUS NIL: 0.01 IU/ML

## 2023-03-22 NOTE — TELEPHONE ENCOUNTER
Patient is calling and wants to know if she can be provided a paper copy of her referral for the dermatologist as she is unable to find it on [facility].

## 2023-04-28 ENCOUNTER — OFFICE VISIT (OUTPATIENT)
Dept: FAMILY MEDICINE CLINIC | Facility: CLINIC | Age: 43
End: 2023-04-28

## 2023-04-28 VITALS
WEIGHT: 125 LBS | SYSTOLIC BLOOD PRESSURE: 104 MMHG | HEIGHT: 61 IN | HEART RATE: 81 BPM | BODY MASS INDEX: 23.6 KG/M2 | DIASTOLIC BLOOD PRESSURE: 70 MMHG

## 2023-04-28 DIAGNOSIS — L03.031 PARONYCHIA OF GREAT TOE OF RIGHT FOOT: Primary | ICD-10-CM

## 2023-04-28 DIAGNOSIS — L60.0 INGROWN RIGHT BIG TOENAIL: ICD-10-CM

## 2023-04-28 DIAGNOSIS — L98.9 FACIAL LESION: ICD-10-CM

## 2023-04-28 PROCEDURE — 99213 OFFICE O/P EST LOW 20 MIN: CPT | Performed by: PHYSICIAN ASSISTANT

## 2023-04-28 PROCEDURE — 3078F DIAST BP <80 MM HG: CPT | Performed by: PHYSICIAN ASSISTANT

## 2023-04-28 PROCEDURE — 3074F SYST BP LT 130 MM HG: CPT | Performed by: PHYSICIAN ASSISTANT

## 2023-04-28 PROCEDURE — 3008F BODY MASS INDEX DOCD: CPT | Performed by: PHYSICIAN ASSISTANT

## 2023-04-28 RX ORDER — CEPHALEXIN 500 MG/1
500 CAPSULE ORAL 3 TIMES DAILY
Qty: 21 CAPSULE | Refills: 0 | Status: SHIPPED | OUTPATIENT
Start: 2023-04-28 | End: 2023-05-05

## 2023-05-26 ENCOUNTER — HOSPITAL ENCOUNTER (OUTPATIENT)
Age: 43
Discharge: HOME | End: 2023-05-26
Payer: COMMERCIAL

## 2023-05-26 ENCOUNTER — APPOINTMENT (OUTPATIENT)
Dept: GENERAL RADIOLOGY | Age: 43
End: 2023-05-26
Attending: NURSE PRACTITIONER
Payer: COMMERCIAL

## 2023-05-26 VITALS
DIASTOLIC BLOOD PRESSURE: 68 MMHG | SYSTOLIC BLOOD PRESSURE: 102 MMHG | RESPIRATION RATE: 16 BRPM | HEART RATE: 88 BPM | OXYGEN SATURATION: 98 % | TEMPERATURE: 98 F

## 2023-05-26 DIAGNOSIS — M76.60 ACHILLES TENDON PAIN: Primary | ICD-10-CM

## 2023-05-26 DIAGNOSIS — J02.9 VIRAL PHARYNGITIS: ICD-10-CM

## 2023-05-26 LAB — S PYO AG THROAT QL: NEGATIVE

## 2023-05-26 PROCEDURE — 99213 OFFICE O/P EST LOW 20 MIN: CPT | Performed by: NURSE PRACTITIONER

## 2023-05-26 PROCEDURE — 73650 X-RAY EXAM OF HEEL: CPT | Performed by: NURSE PRACTITIONER

## 2023-05-26 PROCEDURE — 87880 STREP A ASSAY W/OPTIC: CPT | Performed by: NURSE PRACTITIONER

## 2023-05-26 NOTE — DISCHARGE INSTRUCTIONS
Please elevate the extremity, take Motrin for pain. Close follow-up with primary care provider is recommended. Somewhat gargles and tea with honey may help soothe throat.

## 2023-05-26 NOTE — ED INITIAL ASSESSMENT (HPI)
Pt complaining of left achilles pain since today. Pt was running around with her daughter yesterday.

## 2024-08-27 ENCOUNTER — OFFICE VISIT (OUTPATIENT)
Dept: FAMILY MEDICINE CLINIC | Facility: CLINIC | Age: 44
End: 2024-08-27

## 2024-08-27 VITALS
BODY MASS INDEX: 22.84 KG/M2 | DIASTOLIC BLOOD PRESSURE: 70 MMHG | SYSTOLIC BLOOD PRESSURE: 101 MMHG | WEIGHT: 121 LBS | HEART RATE: 85 BPM | HEIGHT: 61 IN

## 2024-08-27 DIAGNOSIS — J30.2 SEASONAL ALLERGIC RHINITIS, UNSPECIFIED TRIGGER: ICD-10-CM

## 2024-08-27 DIAGNOSIS — Z12.31 SCREENING MAMMOGRAM FOR BREAST CANCER: ICD-10-CM

## 2024-08-27 DIAGNOSIS — Z00.00 WELL ADULT EXAM: Primary | ICD-10-CM

## 2024-08-27 DIAGNOSIS — Z11.1 SCREENING-PULMONARY TB: ICD-10-CM

## 2024-08-27 DIAGNOSIS — Z86.39 HISTORY OF HYPOTHYROIDISM: ICD-10-CM

## 2024-08-27 PROBLEM — O26.643 CHOLESTASIS OF PREGNANCY IN THIRD TRIMESTER (HCC): Status: RESOLVED | Noted: 2017-06-01 | Resolved: 2024-08-27

## 2024-08-27 PROBLEM — O44.20 MARGINAL PLACENTA PREVIA (HCC): Status: RESOLVED | Noted: 2017-02-08 | Resolved: 2024-08-27

## 2024-08-27 PROBLEM — O09.529 AMA (ADVANCED MATERNAL AGE) MULTIGRAVIDA 35+ (HCC): Status: RESOLVED | Noted: 2017-03-08 | Resolved: 2024-08-27

## 2024-08-27 PROBLEM — O26.649 CHOLESTASIS DURING PREGNANCY (HCC): Status: RESOLVED | Noted: 2017-06-01 | Resolved: 2024-08-27

## 2024-08-27 PROBLEM — O47.9 IRREGULAR CONTRACTIONS (HCC): Status: RESOLVED | Noted: 2017-04-25 | Resolved: 2024-08-27

## 2024-08-27 PROBLEM — O41.1290 CHORIOAMNIONITIS (HCC): Status: RESOLVED | Noted: 2017-06-04 | Resolved: 2024-08-27

## 2024-08-27 PROBLEM — O24.419 GESTATIONAL DIABETES MELLITUS (HCC): Status: RESOLVED | Noted: 2017-04-13 | Resolved: 2024-08-27

## 2024-08-27 PROCEDURE — 3078F DIAST BP <80 MM HG: CPT | Performed by: NURSE PRACTITIONER

## 2024-08-27 PROCEDURE — 3074F SYST BP LT 130 MM HG: CPT | Performed by: NURSE PRACTITIONER

## 2024-08-27 PROCEDURE — 99213 OFFICE O/P EST LOW 20 MIN: CPT | Performed by: NURSE PRACTITIONER

## 2024-08-27 PROCEDURE — 3008F BODY MASS INDEX DOCD: CPT | Performed by: NURSE PRACTITIONER

## 2024-08-27 PROCEDURE — 99396 PREV VISIT EST AGE 40-64: CPT | Performed by: NURSE PRACTITIONER

## 2024-08-27 NOTE — ASSESSMENT & PLAN NOTE
Screening labs ordered  Mammogram ordered; encourage sbe  Please aim to eat a diet high in fresh fruits and vegetables, lean protein sources, complex carbohydrates and limited processed and fast foods.  Try to get at least 150 minutes of exercise per week-a combination of weight resistance and cardio is preferred.    Form completed for employeer

## 2024-08-27 NOTE — PROGRESS NOTES
HPI  Pt here for annual physical . Is applying for job at schools. Will need tb test.   Feels well without concerns    Diet-healthy  Exercise-weight lifting, cardio  Sleep-well rested    Periods-regular    ages [age]  Sexually active-not currently ; recently .     Is due for pap-sees ob gyne  Self breast exam-none.   Review of Systems   Constitutional:  Negative for activity change, appetite change, fatigue, fever and unexpected weight change.   HENT:  Negative for congestion, ear pain, rhinorrhea and sneezing.    Eyes:  Negative for pain, redness and visual disturbance.   Respiratory:  Negative for cough, chest tightness, shortness of breath and wheezing.    Cardiovascular:  Negative for chest pain and palpitations.   Gastrointestinal:  Negative for abdominal distention, abdominal pain, constipation, diarrhea, nausea and vomiting.   Genitourinary:  Negative for dysuria, menstrual problem, vaginal discharge and vaginal pain.   Musculoskeletal:  Negative for back pain, gait problem and myalgias.   Skin:  Negative for color change and rash.   Neurological:  Negative for dizziness, weakness and headaches.   Psychiatric/Behavioral:  Negative for dysphoric mood and sleep disturbance. The patient is not nervous/anxious.        Vitals:    24 1022   BP: 101/70   Pulse: 85   Weight: 121 lb (54.9 kg)   Height: 5' 1\" (1.549 m)     Body mass index is 22.86 kg/m².  Wt Readings from Last 6 Encounters:   24 121 lb (54.9 kg)   23 125 lb (56.7 kg)   22 122 lb 12.8 oz (55.7 kg)   21 120 lb (54.4 kg)   21 126 lb (57.2 kg)   19 123 lb (55.8 kg)        Health Maintenance   Topic Date Due    Mammogram  Never done    DTaP,Tdap,and Td Vaccines (1 - Tdap) Never done    Pap Smear  08/15/2021    COVID-19 Vaccine (3 - - season) 2023    Annual Physical  2023    Influenza Vaccine (1) 10/01/2024    Annual Depression Screening  Completed    Pneumococcal Vaccine: Birth to  64yrs  Aged Out       Patient's last menstrual period was 08/13/2024 (exact date).    Past Medical History:    AMA (advanced maternal age) multigravida 35+ (HCC)    Options of FTS, CVS, amnio, genetic counseling, Level 1 vs level 2 u/s reviewed.  If 35-38 y/o by EDC, then EMH u/s at 32 wks & weekly NST at 36            Chemical keratoconjunctivitis of both eyes    Cholestasis during pregnancy (HCC)    Cholestasis of pregnancy in third trimester (HCC)    Chorioamnionitis (HCC)    Hypothyroidism    Varicella       .  Past Surgical History:   Procedure Laterality Date    Colposcopy, cervix w/upper adjacent vagina; w/biopsy(s), cervix         Family History   Problem Relation Age of Onset    Diabetes Father         diet controlled    Diabetes Paternal Grandmother     Cancer Paternal Grandmother         Cancer - lung (cause of death)    Diabetes Paternal Aunt     Glaucoma Neg     Macular degeneration Neg        Social History     Socioeconomic History    Marital status:      Spouse name: Not on file    Number of children: Not on file    Years of education: Not on file    Highest education level: Not on file   Occupational History    Not on file   Tobacco Use    Smoking status: Former     Types: Cigarettes    Smokeless tobacco: Never   Vaping Use    Vaping status: Never Used   Substance and Sexual Activity    Alcohol use: Yes     Comment: Formerly - wine occasionally    Drug use: No    Sexual activity: Not on file   Other Topics Concern     Service Not Asked    Blood Transfusions Not Asked    Caffeine Concern Yes     Comment: 1 cup daily    Occupational Exposure Not Asked    Hobby Hazards Not Asked    Sleep Concern Not Asked    Stress Concern Not Asked    Weight Concern Not Asked    Special Diet Not Asked    Back Care Not Asked    Exercise Not Asked    Bike Helmet Not Asked    Seat Belt Not Asked    Self-Exams Not Asked    Grew up on a farm Not Asked    History of tanning Not Asked    Outdoor occupation Not  Asked    Breast feeding Not Asked    Reaction to local anesthetic No   Social History Narrative    Not on file     Social Determinants of Health     Financial Resource Strain: Not on file   Food Insecurity: Not on file   Transportation Needs: Not on file   Physical Activity: Not on file   Stress: Not on file   Social Connections: Not on file   Housing Stability: Not on file       No current outpatient medications on file.       Allergies:  No Known Allergies    Physical Exam  Vitals and nursing note reviewed.   Constitutional:       General: She is not in acute distress.     Appearance: She is well-developed.   HENT:      Head: Normocephalic and atraumatic.      Right Ear: Tympanic membrane, ear canal and external ear normal.      Left Ear: Tympanic membrane, ear canal and external ear normal.      Nose: Congestion and rhinorrhea present.      Comments: Pale, boggy turbinates bilaterally; clear rhinorrhea present       Mouth/Throat:      Mouth: Mucous membranes are moist.      Pharynx: Oropharynx is clear. No oropharyngeal exudate or posterior oropharyngeal erythema.   Eyes:      General:         Right eye: No discharge.         Left eye: No discharge.      Conjunctiva/sclera: Conjunctivae normal.      Pupils: Pupils are equal, round, and reactive to light.   Neck:      Thyroid: No thyromegaly.   Cardiovascular:      Rate and Rhythm: Normal rate and regular rhythm.      Heart sounds: Normal heart sounds. No murmur heard.  Pulmonary:      Effort: Pulmonary effort is normal. No respiratory distress.      Breath sounds: Normal breath sounds. No wheezing or rales.   Chest:      Chest wall: No tenderness.   Abdominal:      General: Bowel sounds are normal. There is no distension.      Palpations: Abdomen is soft.      Tenderness: There is no abdominal tenderness.   Musculoskeletal:         General: No tenderness. Normal range of motion.      Cervical back: Normal range of motion and neck supple.   Lymphadenopathy:       Cervical: No cervical adenopathy.   Skin:     General: Skin is warm and dry.      Findings: No rash.   Neurological:      Mental Status: She is alert and oriented to person, place, and time.      Coordination: Coordination normal.   Psychiatric:         Behavior: Behavior normal.         Thought Content: Thought content normal.         Judgment: Judgment normal.         Assessment and Plan:  Problem List Items Addressed This Visit       History of hypothyroidism     Check tsh         Relevant Orders    TSH W Reflex To Free T4    Screening mammogram for breast cancer     Encourage monthly self breast exam  Mammogram ordered         Relevant Orders    [facility] MIKEY 2D+3D SCREENING BILAT (CPT=77067/21814)    Screening-pulmonary TB     Quantiferon gold tb test ordered         Relevant Orders    Quantiferon TB Plus    Seasonal allergic rhinitis     -otc non-drowsy antihistamine (generic claritin, zyrtec or allegra)  -add steroidal nasal spray (flonase, rhinocort -generic works well)    -supportive care discussed  -Please call if symptoms worsen or are not resolving.            Well adult exam - Primary     Screening labs ordered  Mammogram ordered; encourage sbe  Please aim to eat a diet high in fresh fruits and vegetables, lean protein sources, complex carbohydrates and limited processed and fast foods.  Try to get at least 150 minutes of exercise per week-a combination of weight resistance and cardio is preferred.    Form completed for employeer         Relevant Orders    CBC, Platelet; No Differential    Comp Metabolic Panel (14)    Hemoglobin A1C    Lipid Panel    TSH W Reflex To Free T4    Vitamin B12    Vitamin D    OBG - INTERNAL                   Discussed plan of care with pt and pt is in agreement.All questions answered. Pt to call with questions or concerns.    Encouraged to sign up for My Chart if not already registered.     Note to patient and family:  The 21st Century Cures Act makes medical notes available to  patients in the interest of transparency.  However, please be advised that this is a medical document.  It is intended as a peer to peer communication.  It is written in medical language and may contain abbreviations or verbiage that are technical and unfamiliar.  It may appear blunt or direct.  Medical documents are intended to carry relevant information, facts as evident, and the clinical opinion of the practitioner.

## 2024-08-28 ENCOUNTER — LAB ENCOUNTER (OUTPATIENT)
Dept: LAB | Age: 44
End: 2024-08-28
Attending: NURSE PRACTITIONER
Payer: COMMERCIAL

## 2024-08-28 DIAGNOSIS — Z86.39 HISTORY OF HYPOTHYROIDISM: ICD-10-CM

## 2024-08-28 DIAGNOSIS — Z11.1 SCREENING-PULMONARY TB: ICD-10-CM

## 2024-08-28 DIAGNOSIS — Z00.00 WELL ADULT EXAM: ICD-10-CM

## 2024-08-28 LAB
ALBUMIN SERPL-MCNC: 4.4 G/DL (ref 3.2–4.8)
ALBUMIN/GLOB SERPL: 1.4 {RATIO} (ref 1–2)
ALP LIVER SERPL-CCNC: 58 U/L
ALT SERPL-CCNC: 12 U/L
ANION GAP SERPL CALC-SCNC: 6 MMOL/L (ref 0–18)
AST SERPL-CCNC: 26 U/L (ref ?–34)
BILIRUB SERPL-MCNC: 2.1 MG/DL (ref 0.3–1.2)
BUN BLD-MCNC: 13 MG/DL (ref 9–23)
BUN/CREAT SERPL: 15.3 (ref 10–20)
CALCIUM BLD-MCNC: 9.4 MG/DL (ref 8.7–10.4)
CHLORIDE SERPL-SCNC: 108 MMOL/L (ref 98–112)
CHOLEST SERPL-MCNC: 233 MG/DL (ref ?–200)
CO2 SERPL-SCNC: 26 MMOL/L (ref 21–32)
CREAT BLD-MCNC: 0.85 MG/DL
DEPRECATED RDW RBC AUTO: 43.2 FL (ref 35.1–46.3)
EGFRCR SERPLBLD CKD-EPI 2021: 88 ML/MIN/1.73M2 (ref 60–?)
ERYTHROCYTE [DISTWIDTH] IN BLOOD BY AUTOMATED COUNT: 12.8 % (ref 11–15)
EST. AVERAGE GLUCOSE BLD GHB EST-MCNC: 105 MG/DL (ref 68–126)
FASTING PATIENT LIPID ANSWER: YES
FASTING STATUS PATIENT QL REPORTED: YES
GLOBULIN PLAS-MCNC: 3.1 G/DL (ref 2–3.5)
GLUCOSE BLD-MCNC: 92 MG/DL (ref 70–99)
HBA1C MFR BLD: 5.3 % (ref ?–5.7)
HCT VFR BLD AUTO: 38.7 %
HDLC SERPL-MCNC: 64 MG/DL (ref 40–59)
HGB BLD-MCNC: 13.1 G/DL
LDLC SERPL CALC-MCNC: 153 MG/DL (ref ?–100)
MCH RBC QN AUTO: 31.1 PG (ref 26–34)
MCHC RBC AUTO-ENTMCNC: 33.9 G/DL (ref 31–37)
MCV RBC AUTO: 91.9 FL
NONHDLC SERPL-MCNC: 169 MG/DL (ref ?–130)
OSMOLALITY SERPL CALC.SUM OF ELEC: 290 MOSM/KG (ref 275–295)
PLATELET # BLD AUTO: 233 10(3)UL (ref 150–450)
POTASSIUM SERPL-SCNC: 4.1 MMOL/L (ref 3.5–5.1)
PROT SERPL-MCNC: 7.5 G/DL (ref 5.7–8.2)
RBC # BLD AUTO: 4.21 X10(6)UL
SODIUM SERPL-SCNC: 140 MMOL/L (ref 136–145)
TRIGL SERPL-MCNC: 91 MG/DL (ref 30–149)
TSI SER-ACNC: 4.22 MIU/ML (ref 0.55–4.78)
VIT B12 SERPL-MCNC: 1737 PG/ML (ref 211–911)
VIT D+METAB SERPL-MCNC: 42.2 NG/ML (ref 30–100)
VLDLC SERPL CALC-MCNC: 17 MG/DL (ref 0–30)
WBC # BLD AUTO: 4.4 X10(3) UL (ref 4–11)

## 2024-08-28 PROCEDURE — 36415 COLL VENOUS BLD VENIPUNCTURE: CPT

## 2024-08-28 PROCEDURE — 83036 HEMOGLOBIN GLYCOSYLATED A1C: CPT

## 2024-08-28 PROCEDURE — 84443 ASSAY THYROID STIM HORMONE: CPT

## 2024-08-28 PROCEDURE — 80061 LIPID PANEL: CPT

## 2024-08-28 PROCEDURE — 80053 COMPREHEN METABOLIC PANEL: CPT

## 2024-08-28 PROCEDURE — 82306 VITAMIN D 25 HYDROXY: CPT

## 2024-08-28 PROCEDURE — 85027 COMPLETE CBC AUTOMATED: CPT

## 2024-08-28 PROCEDURE — 82607 VITAMIN B-12: CPT

## 2024-08-28 PROCEDURE — 86480 TB TEST CELL IMMUN MEASURE: CPT

## 2024-08-30 ENCOUNTER — PATIENT MESSAGE (OUTPATIENT)
Dept: FAMILY MEDICINE CLINIC | Facility: CLINIC | Age: 44
End: 2024-08-30

## 2024-08-30 DIAGNOSIS — R17 ELEVATED BILIRUBIN: Primary | ICD-10-CM

## 2024-08-30 LAB
M TB IFN-G CD4+ T-CELLS BLD-ACNC: 0.01 IU/ML
M TB TUBERC IFN-G BLD QL: NEGATIVE
M TB TUBERC IGNF/MITOGEN IGNF CONTROL: >10 IU/ML
QFT TB1 AG MINUS NIL: 0.02 IU/ML
QFT TB2 AG MINUS NIL: 0.01 IU/ML

## 2024-08-31 ENCOUNTER — TELEPHONE (OUTPATIENT)
Dept: FAMILY MEDICINE CLINIC | Facility: CLINIC | Age: 44
End: 2024-08-31

## 2024-08-31 DIAGNOSIS — E55.9 VITAMIN D DEFICIENCY: Primary | ICD-10-CM

## 2024-08-31 NOTE — TELEPHONE ENCOUNTER
Patient would like to speak to a nurse regarding her test results. Also needs additional physical form for work. Patient is also requesting vitamin D3 which was suppose to be sent but the pharmacy never received. Patient has other medical questions but these were the most important topics she needed to take care of first. Please advise.

## 2024-09-01 NOTE — TELEPHONE ENCOUNTER
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

## 2024-09-03 RX ORDER — ERGOCALCIFEROL 1.25 MG/1
50000 CAPSULE, LIQUID FILLED ORAL WEEKLY
Qty: 12 CAPSULE | Refills: 3 | Status: SHIPPED | OUTPATIENT
Start: 2024-09-03 | End: 2024-09-03 | Stop reason: CLARIF

## 2024-09-03 NOTE — TELEPHONE ENCOUNTER
Spoke with patient as she's concerned about her Bilirubin and she is feeling slight right side dull pain to the right of her umbilicus almost like it gets inflammation. Worsen's if she eats greasy foods, eats late at night, or if she has a glass of wine (which she doesn't do frequently) it causes her to wake up at night in sweats with right pain.    She also advised she takes a Gold Multivitamin with Vitamin K for heart. It has B6 4 mgs and G12 24 mcg's in it as well as Biotin.    Patient is requesting a future redraw for her Bilirubin and her Vitamin B    Please advise

## 2024-09-06 ENCOUNTER — NURSE TRIAGE (OUTPATIENT)
Dept: FAMILY MEDICINE CLINIC | Facility: CLINIC | Age: 44
End: 2024-09-06

## 2024-09-06 ENCOUNTER — OFFICE VISIT (OUTPATIENT)
Dept: FAMILY MEDICINE CLINIC | Facility: CLINIC | Age: 44
End: 2024-09-06
Payer: COMMERCIAL

## 2024-09-06 VITALS
DIASTOLIC BLOOD PRESSURE: 66 MMHG | TEMPERATURE: 98 F | OXYGEN SATURATION: 99 % | HEART RATE: 90 BPM | WEIGHT: 121 LBS | SYSTOLIC BLOOD PRESSURE: 106 MMHG | BODY MASS INDEX: 22.84 KG/M2 | RESPIRATION RATE: 16 BRPM | HEIGHT: 61 IN

## 2024-09-06 DIAGNOSIS — H10.9 CONJUNCTIVITIS OF RIGHT EYE, UNSPECIFIED CONJUNCTIVITIS TYPE: ICD-10-CM

## 2024-09-06 DIAGNOSIS — R05.9 COUGH, UNSPECIFIED TYPE: Primary | ICD-10-CM

## 2024-09-06 PROCEDURE — 87635 SARS-COV-2 COVID-19 AMP PRB: CPT | Performed by: NURSE PRACTITIONER

## 2024-09-06 PROCEDURE — 3078F DIAST BP <80 MM HG: CPT | Performed by: NURSE PRACTITIONER

## 2024-09-06 PROCEDURE — 99213 OFFICE O/P EST LOW 20 MIN: CPT | Performed by: NURSE PRACTITIONER

## 2024-09-06 PROCEDURE — 3074F SYST BP LT 130 MM HG: CPT | Performed by: NURSE PRACTITIONER

## 2024-09-06 PROCEDURE — 3008F BODY MASS INDEX DOCD: CPT | Performed by: NURSE PRACTITIONER

## 2024-09-06 RX ORDER — POLYMYXIN B SULFATE AND TRIMETHOPRIM 1; 10000 MG/ML; [USP'U]/ML
1 SOLUTION OPHTHALMIC EVERY 6 HOURS
Qty: 10 ML | Refills: 0 | Status: SHIPPED | OUTPATIENT
Start: 2024-09-06 | End: 2024-09-13

## 2024-09-06 NOTE — PROGRESS NOTES
CHIEF COMPLAINT:     Chief Complaint   Patient presents with    Nasal Congestion     Sx 2 days - Nasal congestion, inflamed lymph nodes in throat, ST only when swallowing, fatigue, PND, chest congestion, dry cough, bilat ear pressure/tingling  Denies fever, chills, body aches, ear pain, SOB   No Covid test was done at home  OTC Zyrtec    Eye Problem     Sx yesterday - Patient got sunscreen in R eye and has been burning since, inflamed  Denies vision changes  OTC Visine       HPI:   Bonnie Vivas is a 43 year old female who presents for upper respiratory symptoms for  2 days. Patient reports sore throat, congestion, dry cough, lymph node swelling, ear pressure, PND, and right red eye which started yesterday . Symptoms have been stable since onset.  Treating symptoms with zyrtec and visine.   Associated symptoms include see above.    Pt denies fever, SOB, chest pain, n/v/d, abdominal pain, or rash.     PT did report that she was working out yesterday and sweat mixed with sunblock got into her eye. Pt does not wear contacts. Felt musous discharge from eye, crusting in am. No itching or vision changes changes.     Current Outpatient Medications   Medication Sig Dispense Refill    polymyxin B-trimethoprim 86228-5.1 UNIT/ML-% Ophthalmic Solution Place 1 drop into the right eye every 6 (six) hours for 7 days. 10 mL 0      Past Medical History:    AMA (advanced maternal age) multigravida 35+ ([facility])    Options of FTS, CVS, amnio, genetic counseling, Level 1 vs level 2 u/s reviewed.  If 35-38 y/o by EDC, then [facility] u/s at 32 wks & weekly NST at 36            Chemical keratoconjunctivitis of both eyes    Cholestasis during pregnancy (HCC)    Cholestasis of pregnancy in third trimester (HCC)    Chorioamnionitis (HCC)    Hypothyroidism    Varicella      Past Surgical History:   Procedure Laterality Date    Colposcopy, cervix w/upper adjacent vagina; w/biopsy(s), cervix           Social History     Socioeconomic History    Marital  status:    Tobacco Use    Smoking status: Former     Types: Cigarettes    Smokeless tobacco: Never   Vaping Use    Vaping status: Never Used   Substance and Sexual Activity    Alcohol use: Yes     Comment: Formerly - wine occasionally    Drug use: No   Other Topics Concern    Caffeine Concern Yes     Comment: 1 cup daily    Reaction to local anesthetic No         REVIEW OF SYSTEMS:   GENERAL: good appetite  SKIN: no rashes or abnormal skin lesions  HEENT: See HPI  LUNGS: denies shortness of breath or wheezing, See HPI  CARDIOVASCULAR: denies chest pain or palpitations   GI: denies N/V/C or abdominal pain  NEURO: Denies headaches    EXAM:   /66   Pulse 90   Temp 98.4 °F (36.9 °C)   Resp 16   Ht 5' 1\" (1.549 m)   Wt 121 lb (54.9 kg)   LMP 08/13/2024 (Exact Date)   SpO2 99%   BMI 22.86 kg/m²   GENERAL: well developed, well nourished,in no apparent distress  SKIN: no rashes,no suspicious lesions  HEAD: atraumatic, normocephalic.  No tenderness on palpation of  sinuses  EYES: right conjunctiva mildly injected, No discharge, EOM intact, PERRLA  EARS: TM's gray, no  bulging, no retraction,no  fluid, bony landmarks visible  NOSE: Nostrils patent, clear nasal discharge, nasal mucosa pink   THROAT: Oral mucosa pink, moist. Posterior pharynx is not erythematous. no exudates. Tonsils 1/4.    NECK: Supple, non-tender  LUNGS: clear to auscultation bilaterally, no wheezes or rhonchi. Breathing is non labored.  CARDIO: RRR without murmur  EXTREMITIES: no cyanosis, clubbing or edema  LYMPH:  no lymphadenopathy.      Visual Acuity     Vision Screen Test Type: Snellen Wall Chart    Right Eye Visual Acuity: Uncorrected Right Eye Chart Acuity: 20/25   Left Eye Visual Acuity: Uncorrected Left Eye Chart Acuity: 20/20               ASSESSMENT AND PLAN:   Bonnie Vivas is a 43 year old female who presents with upper respiratory symptoms that are consistent with    ASSESSMENT:   Encounter Diagnoses   Name Primary?     Cough, unspecified type Yes    Conjunctivitis of right eye, unspecified conjunctivitis type        PLAN: Meds as below.  Comfort care as described in Patient Instructions    Meds & Refills for this Visit:  Requested Prescriptions     Signed Prescriptions Disp Refills    polymyxin B-trimethoprim 34721-7.1 UNIT/ML-% Ophthalmic Solution 10 mL 0     Sig: Place 1 drop into the right eye every 6 (six) hours for 7 days.     PCR covid test sent.     Rx polytrim. Pt may use if green or yellow discharge develops from eye. Currently recommend  lubricating eye drops and cool compress. Discussed different types of conjunctivitis with patient.     Discussed s/s of worsening infection/condition with Patient and importance of prompt medical re-evaluation including when to seek emergency care. Patient  voiced understanding    Increase fluids and rest. Warm steamy showers.     May consider OTC tylenol or ibuprofen as needed and directed on packaging for pain/fever    May consider OTC guaifenesin as needed and directed on packaging to thin mucus secretions.    May consider OTC dextromethorphan as needed and directed on packaging as a cough suppressant     May consider OTC  pseudoephedrine as needed and directed on packaging as a nasal decongestant    May consider OTC Cepacol throat lozenges as needed and directed on packaging for sore throat.     May consider OTC saline nasal spray per box instructions    Risks, benefits, and side effects of medication discussed. Patient  verbalized understanding and agreement with treatment plan.     All questions and concerns addressed. Encouraged Patient  to call clinic with any questions or concerns. I explained to the patient that emergent conditions may arise and to go to the ER for new, worsening or any persistent conditions.      Patient Instructions   See attached patient care instructions.      The patient indicates understanding of these issues and agrees to the plan.  The patient is asked  to return if sx's persist or worsen.

## 2024-09-06 NOTE — TELEPHONE ENCOUNTER
Action Requested: Summary for Provider     []  Critical Lab, Recommendations Needed  [] Need Additional Advice  []   FYI    []   Need Orders  [] Need Medications Sent to Pharmacy  []  Other     SUMMARY: Patient going to [place] for eval sore throat and nasal congestion    Patient reports sore throat, crusty eyes, nasal congesting with green mucus that has  \"little blood in it\"    Per patients symptoms started 2 days ago \"not sure if it is allergies\"    Per patient is is wheezing \"a little with some chest heaviness, coughing here and there, more like a dry cough\"    Denies: fever    Patient would like evaluation.  Only opening in [place].  [facility] [place]. Address and location provided.         Reason for call: No chief complaint on file.  Onset: Data Unavailable                     Reason for Disposition   Patient wants to be seen    Protocols used: Sore Throat-A-OH

## 2024-09-07 LAB — SARS-COV-2 RNA RESP QL NAA+PROBE: NOT DETECTED

## 2024-09-20 ENCOUNTER — PATIENT MESSAGE (OUTPATIENT)
Dept: FAMILY MEDICINE CLINIC | Facility: CLINIC | Age: 44
End: 2024-09-20

## 2024-09-22 NOTE — TELEPHONE ENCOUNTER
From: Bonnie Vivas  To: Noemi Smith  Sent: 9/20/2024 11:37 AM CDT  Subject: Physical form     Good morning I wouldn’t need this filled out as soon as possible and you can fax it to the number on the sheet and I could also  a copy.   Fax: [phone]

## 2024-10-16 ENCOUNTER — TELEPHONE (OUTPATIENT)
Dept: FAMILY MEDICINE CLINIC | Facility: CLINIC | Age: 44
End: 2024-10-16

## 2024-10-16 NOTE — TELEPHONE ENCOUNTER
Patient dropped off physical form for Provider to fill out. Patient requesting for form to be faxed when completed to [phone]. Please call patient when form is faxed. Placed in Provider's folder.

## 2024-11-02 ENCOUNTER — MED REC SCAN ONLY (OUTPATIENT)
Dept: FAMILY MEDICINE CLINIC | Facility: CLINIC | Age: 44
End: 2024-11-02

## 2024-11-02 NOTE — TELEPHONE ENCOUNTER
Routed to clinical site staff.  Please contact patient as I do not know if form is available for  today or next Monday.

## 2024-11-26 ENCOUNTER — TELEPHONE (OUTPATIENT)
Dept: FAMILY MEDICINE CLINIC | Facility: CLINIC | Age: 44
End: 2024-11-26

## (undated) NOTE — IP AVS SNAPSHOT
[address]  [address] [facility] [phone]                Discharge Summary   5/31/2017    Mansi Guardian           Admission Information        Provider Department    5/31/2017 Libra Noriega MD [facility] 3e C-D PREECLAMPSIA, UNDERSTANDING (ENGLISH)    KICK COUNTS (ENGLISH)         Preeclampsia/Hypertension       Preeclampsia is a serious disease related to high blood pressure (hypertension).   Preeclampsia/hypertension can happen during pregnancy and up to 6 week 34.6 (L) (05/19/17)  90.0 -- -- -- (05/19/17)  196 (05/19/17)  11.5 (H)    (04/10/17)  10.1 (04/10/17)  3.73 (04/10/17)  11.7 (L) (04/10/17)  34.1 (L) (04/10/17)  91.3    (04/10/17)  176 (04/10/17)  11.3 (H)      (04/09/17)  11.7 (04/09/17)  34.1     (04/0

## (undated) NOTE — ED AVS SNAPSHOT
[facility] Emergency Department    [address]     [place] [address]    Phone:  [phone]    Fax:  [phone]           Mansi León   MRN: [id number]    Department:  [facility] Emergency Department   Date of Visit:  6/5/201 indicado, llame al encargado de tony al [phone]. It is our goal to assure that you are completely satisfied with every aspect of your visit today.   In an effort to constantly improve our service to you, we would appreciate any positive or negativ Any imaging studies and labs completed today can be reviewed in your MyChart account. You may have had testing done that requires us to contact you. Please make sure we have your correct phone number on file.       I certified that I have received a copy You can access your MyChart to more actively manage your health care and view more details from this visit by going to [email or web address].   If you've recently had a stay at the Hospital you can access your discharge instructions in [address] by sarah

## (undated) NOTE — MR AVS SNAPSHOT
[place] [facility]/ [place]. [place] [facility] [address]  [phone]  [phone]               Thank you for choosing us for your health care visit with [address] [facility].   We are glad to serve you and happy to provide you with this 1 lancet by Finger stick route 4 (four) times daily. Use as directed. docusate sodium 100 MG Caps   Take 100 mg by mouth 2 (two) times daily as needed for constipation.    Commonly known as:  COLACE           ibuprofen 600 MG Tabs   Take 1 tablet active are less likely to develop some chronic diseases than adults who are inactive.      HOW TO GET STARTED: HOW TO STAY MOTIVATED:   Start activities slowly and build up over time Do what you like   Get your heart pumping – brisk walking, biking, swimmin

## (undated) NOTE — ED AVS SNAPSHOT
[facility] Emergency Department    [address]     [place] [address]    Phone:  [phone]    Fax:  [phone]           Shahnaz Jori   MRN: [id number]    Department:  [facility] Emergency Department   Date of Visit:  6/5/201 and Class Registration line at [phone] or find a doctor online by visiting [email or web address].    IF THERE IS ANY CHANGE OR WORSENING OF YOUR CONDITION, CALL YOUR PRIMARY CARE PHYSICIAN AT ONCE OR RETURN IMMEDIATELY TO [address] [place] [address].     If

## (undated) NOTE — IP AVS SNAPSHOT
[address] [place] [address] [phone]                Discharge Summary   6/1/2017    Cem Carlin           Admission Information        Provider Department    6/1/2017 Cassidy Dumont MD [facility] 3se 1 lancet by Finger stick route 4 (four) times daily. Use as directed. Stop taking on:  7/3/2017    [place]     [    ]    [    ]    [    ]    [    ]       PRENATAL PLUS/IRON 27-1 MG Tabs        Take 1 tablet by mouth.       [    ]    [    ]    [ feed your baby. You will also be given instructions on how to care for your baby. This includes bathing and feeding.   Preparing to go home  You may be anxious to go home as soon as possible.  Before you and your baby go home, a healthcare provider will darin · Sit on firm seats so the stitches pull less.  follow-up  Schedule a  follow-up exam with your healthcare provider for about 6 weeks after delivery. During this exam, your uterus and vaginal area will be checked.  Contact your healthcare Why:  As needed    Contact information:    [place] [address]  [phone]        Follow up with Germania Randall MD. Schedule an appointment as soon as possible for a visit in 6 weeks.     Specialty:  OBSTETRICS & GYNECOLOGY Hospital grade vs. personal pump use Active   Obtaining pump for home / work use, if applicable Active   Cluster pumping Active   Breast massage / compression / manual expression techniques Active   Galagtogogues - contraindicators, side effects, and recom Proper / safe use of pump Active   Proper labeling / transport of expressed milk to NICU / SCN if applicable Active   Ways to stimulate Milk Ejection Reflex (SUDHIR) Active   Hands-on pumping Active   Pumping and breastfeeding frequency guidelines Active   Br Call your doctor if you have excessive bleeding.   More than one pad per hour  Call Lactation Department with any questions/concerns [phone]      In the next few weeks you may be mailed a survey from us asking you to rate your experience here at [facility]

## (undated) NOTE — LETTER
9/7/2022          To Whom It May Concern:     Vivien Hermosillo was seen in the office today for a physical and is in good health to perform her job functions. If you have any questions, please feel free to contact our office.        Sincerely,          ORLANDO Zuleta  [address] [place], [place], [address] [facility] [address]  [phone]        Document electronically generated by:  ORLANDO Zuleta